# Patient Record
Sex: FEMALE | Race: BLACK OR AFRICAN AMERICAN | Employment: OTHER | URBAN - METROPOLITAN AREA
[De-identification: names, ages, dates, MRNs, and addresses within clinical notes are randomized per-mention and may not be internally consistent; named-entity substitution may affect disease eponyms.]

---

## 2017-03-01 PROBLEM — D64.9 ABSOLUTE ANEMIA: Status: ACTIVE | Noted: 2017-03-01

## 2017-03-01 PROBLEM — I11.9 HYPERTENSIVE HEART DISEASE WITHOUT HF (HEART FAILURE): Status: ACTIVE | Noted: 2017-03-01

## 2017-03-01 PROBLEM — M15.9 GENERALIZED OSTEOARTHROSIS, UNSPECIFIED SITE: Status: ACTIVE | Noted: 2017-03-01

## 2017-07-13 PROBLEM — R35.0 URINARY FREQUENCY: Status: ACTIVE | Noted: 2017-07-13

## 2017-07-13 PROBLEM — E53.8 B12 DEFICIENCY: Status: ACTIVE | Noted: 2017-07-13

## 2018-02-14 ENCOUNTER — PATIENT OUTREACH (OUTPATIENT)
Dept: CASE MANAGEMENT | Age: 83
End: 2018-02-14

## 2018-02-14 NOTE — PROGRESS NOTES
TC to Gisela Bustos, pt's [facility]  (#[phone]). She stated that the pt is eligible for nutritional supplements as long as the MD signs that they are medically necessary. She stated that she would fax the form to SW on 2/20/18 (she will not be back in her office until then) or will try to get her supervisor to fax the form. SW will then have MD to complete the form and SW will send back to [facility]. This note will not be viewable in [address]

## 2018-02-20 ENCOUNTER — PATIENT OUTREACH (OUTPATIENT)
Dept: CASE MANAGEMENT | Age: 83
End: 2018-02-20

## 2018-02-20 NOTE — PROGRESS NOTES
Quad cane order faxed to [address] ([phone]). Signed MD order for medical necessity for nutritional supplements emailed to [facility] . This note will not be viewable in [address]

## 2020-01-27 ENCOUNTER — APPOINTMENT (OUTPATIENT)
Dept: GENERAL RADIOLOGY | Age: 85
End: 2020-01-27
Attending: EMERGENCY MEDICINE
Payer: COMMERCIAL

## 2020-01-27 ENCOUNTER — HOSPITAL ENCOUNTER (EMERGENCY)
Age: 85
Discharge: HOME | End: 2020-01-27
Attending: EMERGENCY MEDICINE
Payer: COMMERCIAL

## 2020-01-27 VITALS
RESPIRATION RATE: 18 BRPM | SYSTOLIC BLOOD PRESSURE: 154 MMHG | OXYGEN SATURATION: 99 % | HEART RATE: 64 BPM | DIASTOLIC BLOOD PRESSURE: 96 MMHG | TEMPERATURE: 98.3 F

## 2020-01-27 DIAGNOSIS — R60.9 DEPENDENT EDEMA: ICD-10-CM
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
## 2020-01-27 DIAGNOSIS — S60.449A TIGHT RING ON FINGER: Primary | ICD-10-CM

## 2020-01-27 LAB
ALBUMIN SERPL-MCNC: 3.8 G/DL (ref 3.2–4.6)
ALBUMIN/GLOB SERPL: 1.1 {RATIO} (ref 1.2–3.5)
ALP SERPL-CCNC: 64 U/L (ref 50–136)
ALT SERPL-CCNC: 26 U/L (ref 12–65)
ANION GAP SERPL CALC-SCNC: 6 MMOL/L (ref 7–16)
AST SERPL-CCNC: 18 U/L (ref 15–37)
BASOPHILS # BLD: 0 K/UL (ref 0–0.2)
BASOPHILS NFR BLD: 1 % (ref 0–2)
BILIRUB SERPL-MCNC: 0.5 MG/DL (ref 0.2–1.1)
BNP SERPL-MCNC: 466 PG/ML
BUN SERPL-MCNC: 17 MG/DL (ref 8–23)
CALCIUM SERPL-MCNC: 10 MG/DL (ref 8.3–10.4)
CHLORIDE SERPL-SCNC: 109 MMOL/L (ref 98–107)
CO2 SERPL-SCNC: 27 MMOL/L (ref 21–32)
CREAT SERPL-MCNC: 0.93 MG/DL (ref 0.6–1)
DIFFERENTIAL METHOD BLD: ABNORMAL
EOSINOPHIL # BLD: 0 K/UL (ref 0–0.8)
EOSINOPHIL NFR BLD: 1 % (ref 0.5–7.8)
ERYTHROCYTE [DISTWIDTH] IN BLOOD BY AUTOMATED COUNT: 12.8 % (ref 11.9–14.6)
GLOBULIN SER CALC-MCNC: 3.4 G/DL (ref 2.3–3.5)
GLUCOSE SERPL-MCNC: 104 MG/DL (ref 65–100)
HCT VFR BLD AUTO: 43.3 % (ref 35.8–46.3)
HGB BLD-MCNC: 13.7 G/DL (ref 11.7–15.4)
IMM GRANULOCYTES # BLD AUTO: 0 K/UL (ref 0–0.5)
IMM GRANULOCYTES NFR BLD AUTO: 0 % (ref 0–5)
LYMPHOCYTES # BLD: 1.8 K/UL (ref 0.5–4.6)
LYMPHOCYTES NFR BLD: 36 % (ref 13–44)
MCH RBC QN AUTO: 33.7 PG (ref 26.1–32.9)
MCHC RBC AUTO-ENTMCNC: 31.6 G/DL (ref 31.4–35)
MCV RBC AUTO: 106.7 FL (ref 79.6–97.8)
MONOCYTES # BLD: 0.5 K/UL (ref 0.1–1.3)
MONOCYTES NFR BLD: 9 % (ref 4–12)
NEUTS SEG # BLD: 2.7 K/UL (ref 1.7–8.2)
NEUTS SEG NFR BLD: 54 % (ref 43–78)
NRBC # BLD: 0 K/UL (ref 0–0.2)
PLATELET # BLD AUTO: 230 K/UL (ref 150–450)
PMV BLD AUTO: 10.5 FL (ref 9.4–12.3)
POTASSIUM SERPL-SCNC: 4.3 MMOL/L (ref 3.5–5.1)
PROT SERPL-MCNC: 7.2 G/DL (ref 6.3–8.2)
RBC # BLD AUTO: 4.06 M/UL (ref 4.05–5.2)
SODIUM SERPL-SCNC: 142 MMOL/L (ref 136–145)
WBC # BLD AUTO: 5 K/UL (ref 4.3–11.1)

## 2020-01-27 PROCEDURE — 83880 ASSAY OF NATRIURETIC PEPTIDE: CPT

## 2020-01-27 PROCEDURE — 71046 X-RAY EXAM CHEST 2 VIEWS: CPT

## 2020-01-27 PROCEDURE — 99283 EMERGENCY DEPT VISIT LOW MDM: CPT

## 2020-01-27 PROCEDURE — 80053 COMPREHEN METABOLIC PANEL: CPT

## 2020-01-27 PROCEDURE — 85025 COMPLETE CBC W/AUTO DIFF WBC: CPT

## 2020-01-27 NOTE — ED PROVIDER NOTES
Here with swelling to her right index finger. She has 2 rings on this finger there is a ring that is riding underneath the other ring with both of them having a ring face and one face being entrapped behind the other. She has had progressive swelling. Patient also has dependent edema of concern to family though they do not wish her to be placed on diuretics. She has an appointment next week with cardiology. The history is provided by the patient. Finger Pain    The pain is present in the right fingers. Pertinent negatives include no numbness. She has tried nothing for the symptoms. 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 No pertinent surgical history.       Family History:   Family history unknown: Yes       Social History     Socioeconomic History    Marital status:      Spouse name: Not on file    Number of children: Not on file    Years of education: Not on file    Highest education level: Not on file   Occupational History    Not on file   Social Needs    Financial resource strain: Not on file    Food insecurity:     Worry: Not on file     Inability: Not on file    Transportation needs:     Medical: Not on file     Non-medical: Not on file   Tobacco Use    Smoking status: Former Smoker    Smokeless tobacco: Never Used   Substance and Sexual Activity    Alcohol use: Yes     Comment: occasional alcohol use    Drug use: No    Sexual activity: Not on file   Lifestyle    Physical activity:     Days per week: Not on file     Minutes per session: Not on file    Stress: Not on file   Relationships    Social connections:     Talks on phone: Not on file     Gets together: Not on file     Attends Buddhist service: Not on file     Active member of club or organization: Not on file     Attends meetings of clubs or organizations: Not on file     Relationship status: Not on file    Intimate partner violence:     Fear of current or ex partner: Not on file     Emotionally abused: Not on file     Physically abused: Not on file     Forced sexual activity: Not on file   Other Topics Concern    Not on file   Social History Narrative    Not on file         ALLERGIES: Patient has no known allergies. Review of Systems   Constitutional: Negative for chills and fever. HENT: Negative. Respiratory: Negative. Cardiovascular: Negative. Gastrointestinal: Negative. Musculoskeletal: Negative. Neurological: Negative. Negative for numbness. Psychiatric/Behavioral: Negative. All other systems reviewed and are negative. Vitals:    01/27/20 1218   BP: 132/69   Pulse: 66   Resp: 20   Temp: 97.9 °F (36.6 °C)   SpO2: 100%            Physical Exam  Vitals signs and nursing note reviewed. Constitutional:       General: She is not in acute distress. Appearance: She is well-developed. HENT:      Head: Atraumatic. Eyes:      General: No scleral icterus. Neck:      Musculoskeletal: Neck supple. Cardiovascular:      Rate and Rhythm: Normal rate. Pulmonary:      Effort: Pulmonary effort is normal. No respiratory distress. Musculoskeletal:         General: No deformity or signs of injury. Right lower leg: Edema present. Left lower leg: Edema present. Comments: Has 2 rings are nonremovable to the area just below the MCP joint on her right index finger. These are removed atraumatically after acceptance of removing ring provided by family and patient   Skin:     General: Skin is warm and dry. Neurological:      Sensory: No sensory deficit. Motor: No weakness. Psychiatric:         Thought Content:  Thought content normal.          MDM  Number of Diagnoses or Management Options  Dependent edema:   Tight ring on finger:   Diagnosis management comments: Patient here with 2 rings entrapping her proximal aspect of her right ring finger. Interestingly there is one ring overriding the second. I have attempted to remove the lower ring underneath main body of the upper but have been unsuccessful. Amount and/or Complexity of Data Reviewed  Clinical lab tests: reviewed and ordered  Tests in the radiology section of CPT®: reviewed and ordered  Obtain history from someone other than the patient: yes  Independent visualization of images, tracings, or specimens: yes    Risk of Complications, Morbidity, and/or Mortality  Presenting problems: moderate  Diagnostic procedures: low  Management options: moderate  General comments: They will encourage patient to walk more and see if that helps with her dependent edema. Option of putting her on a few days of diuretics was declined. Patient does have appointment with cardiology next week. Patient Progress  Patient progress: improved         Procedures      Recent Results (from the past 12 hour(s))   CBC WITH AUTOMATED DIFF    Collection Time: 01/27/20  1:20 PM   Result Value Ref Range    WBC 5.0 4.3 - 11.1 K/uL    RBC 4.06 4.05 - 5.2 M/uL    HGB 13.7 11.7 - 15.4 g/dL    HCT 43.3 35.8 - 46.3 %    .7 (H) 79.6 - 97.8 FL    MCH 33.7 (H) 26.1 - 32.9 PG    MCHC 31.6 31.4 - 35.0 g/dL    RDW 12.8 11.9 - 14.6 %    PLATELET 529 231 - 021 K/uL    MPV 10.5 9.4 - 12.3 FL    ABSOLUTE NRBC 0.00 0.0 - 0.2 K/uL    DF AUTOMATED      NEUTROPHILS 54 43 - 78 %    LYMPHOCYTES 36 13 - 44 %    MONOCYTES 9 4.0 - 12.0 %    EOSINOPHILS 1 0.5 - 7.8 %    BASOPHILS 1 0.0 - 2.0 %    IMMATURE GRANULOCYTES 0 0.0 - 5.0 %    ABS. NEUTROPHILS 2.7 1.7 - 8.2 K/UL    ABS. LYMPHOCYTES 1.8 0.5 - 4.6 K/UL    ABS. MONOCYTES 0.5 0.1 - 1.3 K/UL    ABS. EOSINOPHILS 0.0 0.0 - 0.8 K/UL    ABS. BASOPHILS 0.0 0.0 - 0.2 K/UL    ABS. IMM.  GRANS. 0.0 0.0 - 0.5 K/UL   METABOLIC PANEL, COMPREHENSIVE    Collection Time: 01/27/20  1:20 PM   Result Value Ref Range    Sodium 142 136 - 145 mmol/L Potassium 4.3 3.5 - 5.1 mmol/L    Chloride 109 (H) 98 - 107 mmol/L    CO2 27 21 - 32 mmol/L    Anion gap 6 (L) 7 - 16 mmol/L    Glucose 104 (H) 65 - 100 mg/dL    BUN 17 8 - 23 MG/DL    Creatinine 0.93 0.6 - 1.0 MG/DL    GFR est AA >60 >60 ml/min/1.73m2    GFR est non-AA >60 >60 ml/min/1.73m2    Calcium 10.0 8.3 - 10.4 MG/DL    Bilirubin, total 0.5 0.2 - 1.1 MG/DL    ALT (SGPT) 26 12 - 65 U/L    AST (SGOT) 18 15 - 37 U/L    Alk.  phosphatase 64 50 - 136 U/L    Protein, total 7.2 6.3 - 8.2 g/dL    Albumin 3.8 3.2 - 4.6 g/dL    Globulin 3.4 2.3 - 3.5 g/dL    A-G Ratio 1.1 (L) 1.2 - 3.5     NT-PRO BNP    Collection Time: 01/27/20  1:20 PM   Result Value Ref Range    NT pro- (H) <450 PG/ML

## 2020-01-27 NOTE — ED NOTES
I have reviewed discharge instructions with the patient. The patient verbalized understanding. Patient left ED via Discharge Method: wheelchair to Home with family. Opportunity for questions and clarification provided. Patient given 0 scripts. To continue your aftercare when you leave the hospital, you may receive an automated call from our care team to check in on how you are doing. This is a free service and part of our promise to provide the best care and service to meet your aftercare needs.  If you have questions, or wish to unsubscribe from this service please call [phone]. Thank you for Choosing our [facility] Emergency Department.

## 2020-01-27 NOTE — ED TRIAGE NOTES
Patient with daughter states that her right pointer finger is swollen. Patient has 2 rings stuck on finger. Daughter also reports bilateral lower extremity edema. Patient states that she sometimes gets SOB. Patient has some dementia.

## 2021-09-23 ENCOUNTER — APPOINTMENT (OUTPATIENT)
Dept: GENERAL RADIOLOGY | Age: 86
End: 2021-09-23
Attending: EMERGENCY MEDICINE
Payer: COMMERCIAL

## 2021-09-23 ENCOUNTER — HOSPITAL ENCOUNTER (EMERGENCY)
Age: 86
Discharge: HOME | End: 2021-09-24
Attending: EMERGENCY MEDICINE
Payer: COMMERCIAL

## 2021-09-23 DIAGNOSIS — K56.41 FECAL IMPACTION (HCC): Primary | ICD-10-CM

## 2021-09-23 DIAGNOSIS — K59.04 CHRONIC IDIOPATHIC CONSTIPATION: ICD-10-CM

## 2021-09-23 LAB
ALBUMIN SERPL-MCNC: 2.9 G/DL (ref 3.2–4.6)
ALBUMIN/GLOB SERPL: 0.7 {RATIO} (ref 1.2–3.5)
ALP SERPL-CCNC: 64 U/L (ref 50–136)
ALT SERPL-CCNC: 26 U/L (ref 12–65)
ANION GAP SERPL CALC-SCNC: 12 MMOL/L (ref 7–16)
APPEARANCE UR: CLEAR
AST SERPL-CCNC: 22 U/L (ref 15–37)
BASOPHILS # BLD: 0 K/UL (ref 0–0.2)
BASOPHILS NFR BLD: 0 % (ref 0–2)
BILIRUB SERPL-MCNC: 0.5 MG/DL (ref 0.2–1.1)
BILIRUB UR QL: NEGATIVE
BUN SERPL-MCNC: 19 MG/DL (ref 8–23)
CALCIUM SERPL-MCNC: 9.3 MG/DL (ref 8.3–10.4)
CHLORIDE SERPL-SCNC: 111 MMOL/L (ref 98–107)
CO2 SERPL-SCNC: 22 MMOL/L (ref 21–32)
COLOR UR: YELLOW
CREAT SERPL-MCNC: 0.8 MG/DL (ref 0.6–1)
DIFFERENTIAL METHOD BLD: ABNORMAL
EOSINOPHIL # BLD: 0.1 K/UL (ref 0–0.8)
EOSINOPHIL NFR BLD: 1 % (ref 0.5–7.8)
ERYTHROCYTE [DISTWIDTH] IN BLOOD BY AUTOMATED COUNT: 12.9 % (ref 11.9–14.6)
GLOBULIN SER CALC-MCNC: 3.9 G/DL (ref 2.3–3.5)
GLUCOSE SERPL-MCNC: 82 MG/DL (ref 65–100)
GLUCOSE UR STRIP.AUTO-MCNC: NEGATIVE MG/DL
HCT VFR BLD AUTO: 40.9 % (ref 35.8–46.3)
HGB BLD-MCNC: 12.9 G/DL (ref 11.7–15.4)
HGB UR QL STRIP: NEGATIVE
IMM GRANULOCYTES # BLD AUTO: 0 K/UL (ref 0–0.5)
IMM GRANULOCYTES NFR BLD AUTO: 0 % (ref 0–5)
KETONES UR QL STRIP.AUTO: NEGATIVE MG/DL
LEUKOCYTE ESTERASE UR QL STRIP.AUTO: NEGATIVE
LIPASE SERPL-CCNC: 98 U/L (ref 73–393)
LYMPHOCYTES # BLD: 1.6 K/UL (ref 0.5–4.6)
LYMPHOCYTES NFR BLD: 30 % (ref 13–44)
MCH RBC QN AUTO: 34.5 PG (ref 26.1–32.9)
MCHC RBC AUTO-ENTMCNC: 31.5 G/DL (ref 31.4–35)
MCV RBC AUTO: 109.4 FL (ref 79.6–97.8)
MONOCYTES # BLD: 0.5 K/UL (ref 0.1–1.3)
MONOCYTES NFR BLD: 9 % (ref 4–12)
NEUTS SEG # BLD: 3 K/UL (ref 1.7–8.2)
NEUTS SEG NFR BLD: 60 % (ref 43–78)
NITRITE UR QL STRIP.AUTO: NEGATIVE
NRBC # BLD: 0 K/UL (ref 0–0.2)
PH UR STRIP: 6 [PH] (ref 5–9)
PLATELET # BLD AUTO: 202 K/UL (ref 150–450)
PMV BLD AUTO: 10.5 FL (ref 9.4–12.3)
POTASSIUM SERPL-SCNC: 4.7 MMOL/L (ref 3.5–5.1)
PROT SERPL-MCNC: 6.8 G/DL (ref 6.3–8.2)
PROT UR STRIP-MCNC: NEGATIVE MG/DL
RBC # BLD AUTO: 3.74 M/UL (ref 4.05–5.2)
SODIUM SERPL-SCNC: 145 MMOL/L (ref 136–145)
SP GR UR REFRACTOMETRY: 1.01 (ref 1–1.02)
UROBILINOGEN UR QL STRIP.AUTO: 1 EU/DL (ref 0.2–1)
WBC # BLD AUTO: 5.2 K/UL (ref 4.3–11.1)

## 2021-09-23 PROCEDURE — 74011250636 HC RX REV CODE- 250/636: Performed by: EMERGENCY MEDICINE

## 2021-09-23 PROCEDURE — 99284 EMERGENCY DEPT VISIT MOD MDM: CPT

## 2021-09-23 PROCEDURE — 74011250637 HC RX REV CODE- 250/637: Performed by: EMERGENCY MEDICINE

## 2021-09-23 PROCEDURE — 85025 COMPLETE CBC W/AUTO DIFF WBC: CPT

## 2021-09-23 PROCEDURE — 83690 ASSAY OF LIPASE: CPT

## 2021-09-23 PROCEDURE — 81003 URINALYSIS AUTO W/O SCOPE: CPT

## 2021-09-23 PROCEDURE — 74022 RADEX COMPL AQT ABD SERIES: CPT

## 2021-09-23 PROCEDURE — 96374 THER/PROPH/DIAG INJ IV PUSH: CPT

## 2021-09-23 PROCEDURE — 80053 COMPREHEN METABOLIC PANEL: CPT

## 2021-09-23 RX ORDER — ONDANSETRON 2 MG/ML
8 INJECTION INTRAMUSCULAR; INTRAVENOUS
Status: COMPLETED | OUTPATIENT
Start: 2021-09-23 | End: 2021-09-23

## 2021-09-23 RX ORDER — MAGNESIUM CITRATE
296 SOLUTION, ORAL ORAL
Status: COMPLETED | OUTPATIENT
Start: 2021-09-23 | End: 2021-09-23

## 2021-09-23 RX ORDER — SODIUM CHLORIDE 0.9 % (FLUSH) 0.9 %
5-10 SYRINGE (ML) INJECTION AS NEEDED
Status: DISCONTINUED | OUTPATIENT
Start: 2021-09-23 | End: 2021-09-24 | Stop reason: HOSPADM

## 2021-09-23 RX ORDER — SODIUM CHLORIDE 0.9 % (FLUSH) 0.9 %
5-10 SYRINGE (ML) INJECTION EVERY 8 HOURS
Status: DISCONTINUED | OUTPATIENT
Start: 2021-09-23 | End: 2021-09-24 | Stop reason: HOSPADM

## 2021-09-23 RX ADMIN — MAGNESIUM CITRATE 296 ML: 1.75 LIQUID ORAL at 22:18

## 2021-09-23 RX ADMIN — ONDANSETRON 8 MG: 2 INJECTION INTRAMUSCULAR; INTRAVENOUS at 23:59

## 2021-09-23 RX ADMIN — SODIUM CHLORIDE 1000 ML: 900 INJECTION, SOLUTION INTRAVENOUS at 21:31

## 2021-09-24 VITALS
HEIGHT: 67 IN | HEART RATE: 62 BPM | OXYGEN SATURATION: 98 % | WEIGHT: 102 LBS | TEMPERATURE: 98.8 F | DIASTOLIC BLOOD PRESSURE: 85 MMHG | SYSTOLIC BLOOD PRESSURE: 134 MMHG | BODY MASS INDEX: 16.01 KG/M2 | RESPIRATION RATE: 20 BRPM

## 2021-09-24 RX ORDER — POLYETHYLENE GLYCOL 3350 17 G/17G
17 POWDER, FOR SOLUTION ORAL DAILY
Qty: 850 G | Refills: 0 | Status: SHIPPED | OUTPATIENT
Start: 2021-09-24

## 2021-09-24 NOTE — ED PROVIDER NOTES
Mandie Henderson is a 80 y.o. female seen on 9/24/2021 in the [facility] EMERGENCY DEPT in room ER08/08. History limited secondary to patient's dementia. Chief Complaint   Patient presents with    Constipation     HPI: 59-year-old -American female presented emergency department with her daughter with complaints of constipation. Patient has no complaints personally. Patient's daughter states that the patient has not had a good bowel movement more than a month. Patient's appetite has been good and she has been eating and drinking appropriately. She has not been complaining of abdominal pain. Patient's daughter states that she has had to disimpact patient on multiple occasions. Further review of systems is limited secondary to patient's baseline dementia. Historian: Patient's daughter    REVIEW OF SYSTEMS     Review of Systems   Unable to perform ROS: Dementia   Constitutional: Negative for appetite change. Gastrointestinal: Positive for constipation. Genitourinary: Negative. Psychiatric/Behavioral: Negative. PAST MEDICAL HISTORY     Past Medical History:   Diagnosis Date    Absolute anemia 3/1/2017    Generalized osteoarthrosis, unspecified site 3/1/2017    Hypertensive heart disease without HF (heart failure) 3/1/2017     No past surgical history on file.   Social History     Socioeconomic History    Marital status:      Spouse name: Not on file    Number of children: Not on file    Years of education: Not on file    Highest education level: Not on file   Tobacco Use    Smoking status: Former Smoker    Smokeless tobacco: Never Used   Substance and Sexual Activity    Alcohol use: Yes     Comment: occasional alcohol use    Drug use: No     Social Determinants of Health     Financial Resource Strain:     Difficulty of Paying Living Expenses:    Food Insecurity:     Worried About Running Out of Food in the Last Year:     Ran Out of Food in the Last Year:    Transportation Needs:     Lack of Transportation (Medical):  Lack of Transportation (Non-Medical):    Physical Activity:     Days of Exercise per Week:     Minutes of Exercise per Session:    Stress:     Feeling of Stress :    Social Connections:     Frequency of Communication with Friends and Family:     Frequency of Social Gatherings with Friends and Family:     Attends Oriental orthodox Services:     Active Member of Clubs or Organizations:     Attends Club or Organization Meetings:     Marital Status:      Prior to Admission Medications   Prescriptions Last Dose Informant Patient Reported? Taking? QUEtiapine (SEROquel) 50 mg tablet   No No   Si-2 bedtime and can take an additional during the day as needed,   amLODIPine (NORVASC) 5 mg tablet   No No   Sig: Take 1 Tab by mouth daily. apixaban (ELIQUIS) 2.5 mg tablet   No No   Sig: Take 1 Tab by mouth two (2) times a day. aspirin delayed-release 81 mg tablet   No No   Sig: Take 1 Tab by mouth daily. benazepriL (LOTENSIN) 10 mg tablet   No No   Sig: Take 1 Tab by mouth daily. Indications: high blood pressure   food supplemt, lactose-reduced (Ensure Active Heart [employer]) liqd   No No   Sig: Take 237 mL by mouth two (2) times a day. mirtazapine (REMERON SOL-TAB) 15 mg disintegrating tablet   No No   Sig: Take 1 Tab by mouth nightly. multivitamin (ONE A DAY) tablet   No No   Sig: Take 1 Tab by mouth daily. oxybutynin (DITROPAN) 5 mg tablet   No No   Sig: Take 1 Tab by mouth three (3) times daily. pravastatin (PRAVACHOL) 40 mg tablet   No No   Sig: Take 1 Tab by mouth daily. Facility-Administered Medications: None     No Known Allergies     PHYSICAL EXAM       Vitals:    21 2046 21 2132   BP: (!) 142/93  (!) 142/68   Pulse: 63 62    Resp: 20     Temp: 98.8 °F (37.1 °C)     SpO2: 98% 98%     Vital signs were reviewed. Physical Exam  Vitals and nursing note reviewed. Constitutional:       Appearance: Normal appearance. She is not ill-appearing. HENT:      Head: Normocephalic and atraumatic. Mouth/Throat:      Mouth: Mucous membranes are dry. Eyes:      Extraocular Movements: Extraocular movements intact. Cardiovascular:      Rate and Rhythm: Normal rate. Pulses: Normal pulses. Heart sounds: Normal heart sounds. Abdominal:      General: There is distension (Mild distention). Palpations: Abdomen is soft. Tenderness: There is no abdominal tenderness. There is no guarding or rebound. Genitourinary:     Comments: Significant stool in the rectal vault  Musculoskeletal:         General: Normal range of motion. Skin:     General: Skin is warm and dry. Neurological:      Mental Status: She is alert. Mental status is at baseline. Comments: Oriented to person and place   Psychiatric:      Comments: Very pleasant. Baseline dementia.           MEDICAL DECISION MAKING     ED Course:    Orders Placed This Encounter    XR ABD ACUTE W 1 V CHEST    CBC with Diff    CMP    Lipase    LACTIC ACID    LACTIC ACID 2 HOUR REPEAT    URINALYSIS W/ RFLX MICROSCOPIC    DIET NPO    POC Urine Dipstick    ENEMA MILK AND MOLASSES    ARTERIAL BLOOD GAS (ASAP ARIS)    SALINE LOCK IV ONE TIME Routine    sodium chloride (NS) flush 5-10 mL    sodium chloride (NS) flush 5-10 mL    sodium chloride 0.9 % bolus infusion 1,000 mL    magnesium citrate solution 296 mL    ondansetron (ZOFRAN) injection 8 mg    polyethylene glycol (Miralax) 17 gram/dose powder     Recent Results (from the past 8 hour(s))   CBC WITH AUTOMATED DIFF    Collection Time: 09/23/21  8:46 PM   Result Value Ref Range    WBC 5.2 4.3 - 11.1 K/uL    RBC 3.74 (L) 4.05 - 5.2 M/uL    HGB 12.9 11.7 - 15.4 g/dL    HCT 40.9 35.8 - 46.3 %    .4 (H) 79.6 - 97.8 FL    MCH 34.5 (H) 26.1 - 32.9 PG    MCHC 31.5 31.4 - 35.0 g/dL    RDW 12.9 11.9 - 14.6 %    PLATELET 170 778 - 959 K/uL    MPV 10.5 9.4 - 12.3 FL    ABSOLUTE NRBC 0.00 0.0 - 0.2 K/uL    NEUTROPHILS 60 43 - 78 %    LYMPHOCYTES 30 13 - 44 %    MONOCYTES 9 4.0 - 12.0 %    EOSINOPHILS 1 0.5 - 7.8 %    BASOPHILS 0 0.0 - 2.0 %    IMMATURE GRANULOCYTES 0 0.0 - 5.0 %    ABS. NEUTROPHILS 3.0 1.7 - 8.2 K/UL    ABS. LYMPHOCYTES 1.6 0.5 - 4.6 K/UL    ABS. MONOCYTES 0.5 0.1 - 1.3 K/UL    ABS. EOSINOPHILS 0.1 0.0 - 0.8 K/UL    ABS. BASOPHILS 0.0 0.0 - 0.2 K/UL    ABS. IMM. GRANS. 0.0 0.0 - 0.5 K/UL    DF AUTOMATED     METABOLIC PANEL, COMPREHENSIVE    Collection Time: 09/23/21  8:46 PM   Result Value Ref Range    Sodium 145 136 - 145 mmol/L    Potassium 4.7 3.5 - 5.1 mmol/L    Chloride 111 (H) 98 - 107 mmol/L    CO2 22 21 - 32 mmol/L    Anion gap 12 7 - 16 mmol/L    Glucose 82 65 - 100 mg/dL    BUN 19 8 - 23 MG/DL    Creatinine 0.80 0.6 - 1.0 MG/DL    GFR est AA >60 >60 ml/min/1.73m2    GFR est non-AA >60 >60 ml/min/1.73m2    Calcium 9.3 8.3 - 10.4 MG/DL    Bilirubin, total 0.5 0.2 - 1.1 MG/DL    ALT (SGPT) 26 12 - 65 U/L    AST (SGOT) 22 15 - 37 U/L    Alk. phosphatase 64 50 - 136 U/L    Protein, total 6.8 6.3 - 8.2 g/dL    Albumin 2.9 (L) 3.2 - 4.6 g/dL    Globulin 3.9 (H) 2.3 - 3.5 g/dL    A-G Ratio 0.7 (L) 1.2 - 3.5     LIPASE    Collection Time: 09/23/21  8:46 PM   Result Value Ref Range    Lipase 98 73 - 393 U/L   URINALYSIS W/ RFLX MICROSCOPIC    Collection Time: 09/23/21 10:52 PM   Result Value Ref Range    Color YELLOW      Appearance CLEAR      Specific gravity 1.015 1.001 - 1.023      pH (UA) 6.0 5.0 - 9.0      Protein Negative NEG mg/dL    Glucose Negative mg/dL    Ketone Negative NEG mg/dL    Bilirubin Negative NEG      Blood Negative NEG      Urobilinogen 1.0 0.2 - 1.0 EU/dL    Nitrites Negative NEG      Leukocyte Esterase Negative NEG       XR ABD ACUTE W 1 V CHEST    Result Date: 9/23/2021  Exam: XR ABD ACUTE W 1 V CHEST on 9/23/2021 10:19 PM Clinical History: The Female patient is 80years old  presenting for constipation.  Comparison:  Chest x-ray 1/27/2020 Findings: The lungs are free of acute infiltrate. There is continued elevation of the left hemidiaphragm. There is no pleural effusion or pneumothorax. The cardiomediastinal silhouette is within normal limits. There is no acute osseous abnormality. There is marked gaseous distention of bowel loops throughout the abdomen with a large amount of stool in the rectal vault suggesting impaction. There is no soft tissue mass or organomegaly. No abnormal calcifications are identified. There is no free intraperitoneal air. No acute osseous abnormality is demonstrated. 1.  No acute cardiopulmonary disease. 2.  Suggested fecal impaction with gaseous distended loops of bowel throughout abdomen CPT code(s) 29493,48468           MDM  Number of Diagnoses or Management Options  Diagnosis management comments: 54-year-old female presented to the emergency department with significant fecal impaction. Patient's labs were reassuring. Patient was given magnesium citrate to drink and I performed a manual disimpaction with clearing of the entire rectal vault. Patient will be discharged home. She will return the emergency department for any concerns. She was also given a milk molasses enema to help with continued bowel movements. Amount and/or Complexity of Data Reviewed  Clinical lab tests: ordered and reviewed  Tests in the radiology section of CPT®: ordered and reviewed  Obtain history from someone other than the patient: yes  Independent visualization of images, tracings, or specimens: yes    Patient Progress  Patient progress: improved        Disposition:  Discharged  Diagnosis:     ICD-10-CM ICD-9-CM   1. Fecal impaction (HCC)  K56.41 560.32   2. Chronic idiopathic constipation  K59.04 564.00     ____________________________________________________________________  A portion of this note was generated using voice recognition dictation software.  While the note has been reviewed for accuracy, please note certain words and phrases may not be transcribed as intended and some grammatical and/or typographical errors may be present.

## 2021-09-24 NOTE — ED TRIAGE NOTES
Arrives with face mask in place. Daughter arrives with pt to answer triage questions. Reports constipation, reports last normal BM approx 1 month ago. Daughter reports has attempted multiple OTC meds without relief. Pt denies abdominal pain, n/v, fever/chills. Daughter reports normal urination.  Daughter reports eating  normal

## 2021-09-24 NOTE — DISCHARGE INSTRUCTIONS
Encourage fluid hydration. Return to the emergency department for any concerns. You may have some mild bleeding from the disimpaction.

## 2021-09-24 NOTE — ED NOTES
I have reviewed discharge instructions with the patient. The patient verbalized understanding. Patient left ED via Discharge Method: wheelchair to Home with son    Opportunity for questions and clarification provided. Patient given 0 scripts. To continue your aftercare when you leave the hospital, you may receive an automated call from our care team to check in on how you are doing. This is a free service and part of our promise to provide the best care and service to meet your aftercare needs.  If you have questions, or wish to unsubscribe from this service please call [phone]. Thank you for Choosing our [facility] Emergency Department.

## 2021-10-07 PROBLEM — E78.5 HYPERLIPIDEMIA: Status: ACTIVE | Noted: 2020-08-20

## 2021-10-07 PROBLEM — I48.0 PAROXYSMAL ATRIAL FIBRILLATION (HCC): Status: ACTIVE | Noted: 2020-08-20

## 2021-10-07 PROBLEM — F03.90 DEMENTIA (HCC): Status: ACTIVE | Noted: 2020-08-20

## 2021-10-07 PROBLEM — E44.0 PROTEIN-CALORIE MALNUTRITION, MODERATE (HCC): Status: ACTIVE | Noted: 2020-08-20

## 2021-10-11 ENCOUNTER — DOCUMENTATION ONLY (OUTPATIENT)
Dept: CASE MANAGEMENT | Age: 86
End: 2021-10-11

## 2021-10-11 NOTE — ACP (ADVANCE CARE PLANNING)
Advance Care Planning   Ambulatory ACP Specialist Patient Outreach    Date:  10/11/2021    ACP Specialist:  Chandu Easley LMSW    Outreach call to patient in follow-up to ACP Specialist referral from:    [x] PCP  [] Provider   [] Ambulatory Care Management [] Other     For:                  [x] Advance Directive Assistance              [] Complete Portable DNR order              [] Complete POST/MOST              [x] Code Status Discussion             [x] Discuss Goals of Care             [x] Early ACP Decision-Making              [] Other (Specify)    Date Referral Received:10/8/21    Today's Outreach:  [] First   [] Second  [] Third       Third outreach made by: [] Phone  [] Email / mail    [] [employer]     Intervention:  [] Spoke with Patient   [] Left VM requesting return call      Outcome: Pt's dtr called SW back and said that she and her siblings have felt like they needed to move forward with this process for a while now. Pt's dtr asked for information to be emailed to her for review as a family and a follow up call next week. Next Step:   [] ACP scheduled conversation  [x] Outreach again in one week               [x] Email / Mail ACP Info Sheets  [x] Email / Mail Advance Directive   [] Closing referral.  Routing closure to referring provider/staff and to ACP Specialist . [] Closure letter mailed to patient with invitation to contact ACP Specialist if / when ready.   Thank you for this referral.

## 2021-10-11 NOTE — ACP (ADVANCE CARE PLANNING)
Advance Care Planning   Ambulatory ACP Specialist Patient Outreach    Date:  10/11/2021    ACP Specialist:  Elma Shukla LMSW    Outreach call to patient in follow-up to ACP Specialist referral from:    [x] PCP  [] Provider   [] Ambulatory Care Management [] Other     For:                  [x] Advance Directive Assistance              [] Complete Portable DNR order              [] Complete POST/MOST              [x] Code Status Discussion             [x] Discuss Goals of Care             [x] Early ACP Decision-Making              [] Other (Specify)    Date Referral Received:10/8/21    Today's Outreach:  [x] First   [] Second  [] Third       Third outreach made by: [] Phone  [] Email / mail    [] [employer]     Intervention:  [] Spoke with Patient   [x] Left VM requesting return call      Outcome: SW called to try and touch base with pt about ACP referral.  However, SW got pt's voice mail and had to leave a message     Next Step:   [] ACP scheduled conversation  [x] Outreach again in one week               [] Email / Mail [address]  [] Email / Mail Advance Directive   [] Closing referral.  Routing closure to referring provider/staff and to ACP Specialist . [] Closure letter mailed to patient with invitation to contact ACP Specialist if / when ready.   Thank you for this referral.

## 2021-10-18 ENCOUNTER — DOCUMENTATION ONLY (OUTPATIENT)
Dept: CASE MANAGEMENT | Age: 86
End: 2021-10-18

## 2021-10-18 NOTE — ACP (ADVANCE CARE PLANNING)
Advance Care Planning   Ambulatory ACP Specialist Patient Outreach    Date:  10/18/2021    ACP Specialist:  Jose Del Cid LMSW    Outreach call to patient in follow-up to ACP Specialist referral from:    [x] PCP  [] Provider   [] Ambulatory Care Management [] Other     For:                  [x] Advance Directive Assistance              [] Complete Portable DNR order              [] Complete POST/MOST              [x] Code Status Discussion             [x] Discuss Goals of Care             [x] Early ACP Decision-Making              [] Other (Specify)    Date Referral Received:10/8/21    Today's Outreach:  [] First   [] Second  [x] Third       Third outreach made by: [] Phone  [] Email / mail    [] [employer]     Intervention:  [] Spoke with Patient   [] Left VM requesting return call      Outcome: SW called and spoke with pt's dtr again today. Pt's dtr said that the family as a whole hasn't had a chance to come together and go over pt's wishes yet. Pt's dtr said she would call when they were ready to move forward. SW made sure that they know how to get in touch and that for now we will be closing this referral she was able to verbalize understanding. Next Step:   [] ACP scheduled conversation  [] Outreach again in one week               [] Email / Mail ACP Info Sheets  [] Email / Mail Advance Directive   [x] Closing referral.  Routing closure to referring provider/staff and to ACP Specialist . [] Closure letter mailed to patient with invitation to contact ACP Specialist if / when ready.   Thank you for this referral.

## 2022-03-18 PROBLEM — I48.0 PAROXYSMAL ATRIAL FIBRILLATION (HCC): Status: ACTIVE | Noted: 2020-08-20

## 2022-03-18 PROBLEM — E78.5 HYPERLIPIDEMIA: Status: ACTIVE | Noted: 2020-08-20

## 2022-03-18 PROBLEM — I11.9 HYPERTENSIVE HEART DISEASE WITHOUT HF (HEART FAILURE): Status: ACTIVE | Noted: 2017-03-01

## 2022-03-18 PROBLEM — D64.9 ABSOLUTE ANEMIA: Status: ACTIVE | Noted: 2017-03-01

## 2022-03-19 PROBLEM — F03.90 DEMENTIA (HCC): Status: ACTIVE | Noted: 2020-08-20

## 2022-03-19 PROBLEM — E44.0 PROTEIN-CALORIE MALNUTRITION, MODERATE (HCC): Status: ACTIVE | Noted: 2020-08-20

## 2022-03-20 PROBLEM — E53.8 B12 DEFICIENCY: Status: ACTIVE | Noted: 2017-07-13

## 2022-03-20 PROBLEM — R35.0 URINARY FREQUENCY: Status: ACTIVE | Noted: 2017-07-13

## 2022-03-21 ENCOUNTER — APPOINTMENT (OUTPATIENT)
Dept: CT IMAGING | Age: 87
End: 2022-03-21
Attending: EMERGENCY MEDICINE
Payer: COMMERCIAL

## 2022-03-21 ENCOUNTER — HOSPITAL ENCOUNTER (EMERGENCY)
Age: 87
Discharge: HOME | End: 2022-03-21
Attending: EMERGENCY MEDICINE
Payer: COMMERCIAL

## 2022-03-21 VITALS
OXYGEN SATURATION: 99 % | SYSTOLIC BLOOD PRESSURE: 125 MMHG | WEIGHT: 105 LBS | TEMPERATURE: 97.8 F | DIASTOLIC BLOOD PRESSURE: 99 MMHG | BODY MASS INDEX: 16.48 KG/M2 | HEIGHT: 67 IN | HEART RATE: 64 BPM | RESPIRATION RATE: 20 BRPM

## 2022-03-21 DIAGNOSIS — S09.90XA CLOSED HEAD INJURY, INITIAL ENCOUNTER: ICD-10-CM

## 2022-03-21 DIAGNOSIS — S16.1XXA ACUTE CERVICAL MYOFASCIAL STRAIN, INITIAL ENCOUNTER: ICD-10-CM
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
## 2022-03-21 LAB
ALBUMIN SERPL-MCNC: 3.4 G/DL (ref 3.2–4.6)
ALBUMIN/GLOB SERPL: 1 {RATIO} (ref 1.2–3.5)
ALP SERPL-CCNC: 76 U/L (ref 50–136)
ALT SERPL-CCNC: 36 U/L (ref 12–65)
ANION GAP SERPL CALC-SCNC: 4 MMOL/L (ref 7–16)
AST SERPL-CCNC: 35 U/L (ref 15–37)
BASOPHILS # BLD: 0 K/UL (ref 0–0.2)
BASOPHILS NFR BLD: 1 % (ref 0–2)
BILIRUB SERPL-MCNC: 0.4 MG/DL (ref 0.2–1.1)
BUN SERPL-MCNC: 25 MG/DL (ref 8–23)
CALCIUM SERPL-MCNC: 9.1 MG/DL (ref 8.3–10.4)
CHLORIDE SERPL-SCNC: 107 MMOL/L (ref 98–107)
CO2 SERPL-SCNC: 28 MMOL/L (ref 21–32)
CREAT SERPL-MCNC: 1 MG/DL (ref 0.6–1)
DIFFERENTIAL METHOD BLD: ABNORMAL
EOSINOPHIL # BLD: 0 K/UL (ref 0–0.8)
EOSINOPHIL NFR BLD: 1 % (ref 0.5–7.8)
ERYTHROCYTE [DISTWIDTH] IN BLOOD BY AUTOMATED COUNT: 13.1 % (ref 11.9–14.6)
GLOBULIN SER CALC-MCNC: 3.4 G/DL (ref 2.3–3.5)
GLUCOSE SERPL-MCNC: 100 MG/DL (ref 65–100)
HCT VFR BLD AUTO: 37.2 % (ref 35.8–46.3)
HGB BLD-MCNC: 12 G/DL (ref 11.7–15.4)
IMM GRANULOCYTES # BLD AUTO: 0 K/UL (ref 0–0.5)
IMM GRANULOCYTES NFR BLD AUTO: 1 % (ref 0–5)
LYMPHOCYTES # BLD: 1 K/UL (ref 0.5–4.6)
LYMPHOCYTES NFR BLD: 25 % (ref 13–44)
MCH RBC QN AUTO: 33.9 PG (ref 26.1–32.9)
MCHC RBC AUTO-ENTMCNC: 32.3 G/DL (ref 31.4–35)
MCV RBC AUTO: 105.1 FL (ref 79.6–97.8)
MONOCYTES # BLD: 0.3 K/UL (ref 0.1–1.3)
MONOCYTES NFR BLD: 7 % (ref 4–12)
NEUTS SEG # BLD: 2.7 K/UL (ref 1.7–8.2)
NEUTS SEG NFR BLD: 67 % (ref 43–78)
NRBC # BLD: 0 K/UL (ref 0–0.2)
PLATELET # BLD AUTO: 150 K/UL (ref 150–450)
PMV BLD AUTO: 12.3 FL (ref 9.4–12.3)
POTASSIUM SERPL-SCNC: 5.2 MMOL/L (ref 3.5–5.1)
PROT SERPL-MCNC: 6.8 G/DL (ref 6.3–8.2)
RBC # BLD AUTO: 3.54 M/UL (ref 4.05–5.2)
SODIUM SERPL-SCNC: 139 MMOL/L (ref 136–145)
WBC # BLD AUTO: 4 K/UL (ref 4.3–11.1)

## 2022-03-21 PROCEDURE — 70486 CT MAXILLOFACIAL W/O DYE: CPT

## 2022-03-21 PROCEDURE — 70450 CT HEAD/BRAIN W/O DYE: CPT

## 2022-03-21 PROCEDURE — 72125 CT NECK SPINE W/O DYE: CPT

## 2022-03-21 PROCEDURE — 99284 EMERGENCY DEPT VISIT MOD MDM: CPT

## 2022-03-21 PROCEDURE — 80053 COMPREHEN METABOLIC PANEL: CPT

## 2022-03-21 PROCEDURE — 85025 COMPLETE CBC W/AUTO DIFF WBC: CPT

## 2022-03-21 NOTE — ED NOTES
Discharge instructions and follow up care reviewed with pt and family. Pt assisted to wheelchair and out of department at this time.

## 2022-03-21 NOTE — ED PROVIDER NOTES
This is an 59-year-old female brought in by her daughter. Patient lives with her son. The patient has dementia cannot really give much history. She remembers falling but not sure when it was was. She denies to me any pain. Patient evidently fell in the middle of the night about 36 hours ago getting up to possibly go to the bathroom. Son evidently checked on the patient she did not complain of any pain and no obvious injuries were noted and the patient went back to bed. Daughter checked on patient today and noticed a lot of bruising about the head and face and brought her in to be checked. Takes a baby aspirin today but no other anticoagulants. History of some anemia and hypertension as well. Daughter does not know of any vomiting any lethargy any fever or trouble breathing. The history is provided by the patient and a relative. Fall  The accident occurred yesterday. The fall occurred while standing. She fell from a height of ground level. She landed on hard floor. There was no blood loss. The patient is experiencing no pain. She was ambulatory at the scene. Pertinent negatives include no visual change, no fever, no abdominal pain, no vomiting, no headaches, no extremity weakness, no loss of consciousness and no laceration. The risk factors include dementia. She has tried acetaminophen for the symptoms. Past Medical History:   Diagnosis Date    Absolute anemia 3/1/2017    Generalized osteoarthrosis, unspecified site 3/1/2017    Hypertensive heart disease without HF (heart failure) 3/1/2017       No past surgical history on file.       Family History:   Family history unknown: Yes       Social History     Socioeconomic History    Marital status:      Spouse name: Not on file    Number of children: Not on file    Years of education: Not on file    Highest education level: Not on file   Occupational History    Not on file   Tobacco Use    Smoking status: Former Smoker    Smokeless tobacco: Never Used   Substance and Sexual Activity    Alcohol use: Yes     Comment: occasional alcohol use    Drug use: No    Sexual activity: Not on file   Other Topics Concern    Not on file   Social History Narrative    Not on file     Social Determinants of Health     Financial Resource Strain:     Difficulty of Paying Living Expenses: Not on file   Food Insecurity:     Worried About Running Out of Food in the Last Year: Not on file    Ld of Food in the Last Year: Not on file   Transportation Needs:     Lack of Transportation (Medical): Not on file    Lack of Transportation (Non-Medical): Not on file   Physical Activity:     Days of Exercise per Week: Not on file    Minutes of Exercise per Session: Not on file   Stress:     Feeling of Stress : Not on file   Social Connections:     Frequency of Communication with Friends and Family: Not on file    Frequency of Social Gatherings with Friends and Family: Not on file    Attends Church Services: Not on file    Active Member of [address] [place] [address] or Organizations: Not on file    Attends Club or Organization Meetings: Not on file    Marital Status: Not on file   Intimate Partner Violence:     Fear of Current or Ex-Partner: Not on file    Emotionally Abused: Not on file    Physically Abused: Not on file    Sexually Abused: Not on file   Housing Stability:     Unable to Pay for Housing in the Last Year: Not on file    Number of [place] in the Last Year: Not on file    Unstable Housing in the Last Year: Not on file         ALLERGIES: Patient has no known allergies. Review of Systems   Unable to perform ROS: Dementia   Constitutional: Negative for chills and fever. Respiratory: Negative for cough and shortness of breath. Cardiovascular: Negative for chest pain. Gastrointestinal: Negative for abdominal pain and vomiting. Musculoskeletal: Negative for extremity weakness. Neurological: Negative for loss of consciousness and headaches. Vitals:    03/21/22 1339   BP: 133/81   Pulse: 64   Resp: 20   Temp: 97.8 °F (36.6 °C)   SpO2: 90%   Weight: 47.6 kg (105 lb)   Height: 5' 7\" (1.702 m)            Physical Exam  Vitals and nursing note reviewed. Constitutional:       General: She is not in acute distress. Appearance: She is well-developed. HENT:      Head: Atraumatic. Comments: Frontal contusion with mild soft tissue swelling. Also periorbital contusions bilaterally. No particular facial tenderness     Nose: Nose normal.   Eyes:      Extraocular Movements: Extraocular movements intact. Pupils: Pupils are equal, round, and reactive to light. Comments: Subconjunctival hemorrhages bilaterally   Cardiovascular:      Rate and Rhythm: Normal rate and regular rhythm. Heart sounds: Normal heart sounds. Pulmonary:      Effort: Pulmonary effort is normal.      Breath sounds: Normal breath sounds. Chest:      Comments: Chest wall nontender  Abdominal:      General: There is no distension. Palpations: Abdomen is soft. Tenderness: There is no abdominal tenderness. Musculoskeletal:      Cervical back: Normal range of motion and neck supple. No muscular tenderness. Comments: No issues with pain or limited range of motion in either shoulder elbow hips or knees. Skin:     General: Skin is warm. Findings: No laceration. Neurological:      Mental Status: She is alert. Comments: Speech normal  Oriented x1. Recognizes daughter. Daughter states baseline mentation. Equal . Too much dementia to cooperate with formal exam          MDM  Number of Diagnoses or Management Options  Diagnosis management comments: Elderly patient with fall 1930 6 hours ago. CT of head and spine.        Amount and/or Complexity of Data Reviewed  Tests in the radiology section of CPT®: ordered and reviewed  Review and summarize past medical records: yes    Risk of Complications, Morbidity, and/or Mortality  Presenting problems: moderate  Diagnostic procedures: low  Management options: low    Patient Progress  Patient progress: stable         Procedures        CT HEAD WO CONT    Result Date: 3/21/2022  HEAD CT WITHOUT CONTRAST  3/21/2022 HISTORY:   Head Trauma  2 days after fall out of bed, presents with bruising to bilateral eye, left forehead, and nose. Denies blood thinner. History of dementia, daughter states that she is at her baseline mentation. TECHNIQUE: Noncontrast axial images were obtained through the brain. All CT scans at this facility used dose modulation, interactive reconstruction and/or weight based dosing when appropriate to reduce radiation dose to as low as reasonably achievable. COMPARISON: None FINDINGS: There is no acute intracranial hemorrhage, significant mass effect or CT evidence of acute large artery territorial infarction. Please note that a hyperacute infarct or small vessel infarct may not be apparent on initial CT imaging. Motion artifact is present throughout the study. This midportion may obscure intracranial pathology. Cerebral volume loss is present with symmetric hippocampal atrophy. Areas of decreased attenuation in the supratentorial white matter suggest changes of chronic small vessel ischemic disease. Soft tissue swelling is present in the left frontal scalp. There is no hydrocephalus , intra-axial mass or abnormal extra-axial fluid collection. There are no displaced skull fractures. The mastoid air cells and paranasal sinuses are clear where imaged. 1. Left frontal scalp hematoma without acute intracranial hemorrhage. 2. Cerebral volume loss and white matter findings compatible with chronic small vessel ischemic disease. CT MAXILLOFACIAL WO CONT    Result Date: 3/21/2022  MAXILLOFACIAL CT WITHOUT CONTRAST HISTORY:  fall head trauma, bruising to bilateral eyesq TECHNIQUE: Noncontrast axial images were obtained through the face. Multiplanar reformatted images were generated. All CT scans at this facility used dose modulation, iterative reconstruction and/or weight based dosing when appropriate to reduce radiation dose to as low as reasonably achievable. COMPARISON:  None. FINDINGS:  There is no displaced facial fracture. Orbital floors are intact. Mandibular condyles are in a normal position. Bilateral TMJ osteoarthritis is present. The paranasal sinuses and mastoid air cells are well aerated. Soft tissue swelling is present in the supraorbital left frontal scalp. No displaced facial fracture. CT SPINE CERV WO CONT    Result Date: 3/21/2022  CT CERVICAL SPINE WITHOUT CONTRAST HISTORY:  Fall Trauma; 2 days after fall out of bed, presents with bruising to bilateral eye, left forehead, and nose. Denies blood thinner. History of dementia, daughter states that she is at her baseline mentation. TECHNIQUE: Noncontrast axial images were obtained from the craniocervical junction through C7-T1. Multiplanar reformatted images were generated. All CT scans at this facility used dose modulation, iterative reconstruction and/or weight based dosing when appropriate to reduce radiation dose to as low as reasonably achievable. COMPARISON:  None FINDINGS:  Multilevel degenerative spondylosis is present with reversal of normal cervical lordosis. The cervical vertebrae are normal in height. The articular facets overlap appropriately. Uncovertebral osteophytes contribute to multilevel foraminal stenosis at the C3-C4, C4-C5, C5-C6 and C6-C7 levels. There is ossification of the bilateral stylohyoid ligaments. Axial images demonstrate no displaced cervical spine fracture. Included images of the lung apices are clear. There is distention of the esophagus at the thoracic inlet. 1. No acute findings in the cervical spine. 2. Multilevel degenerative spondylosis and foraminal stenosis. 3. Distention of the proximal most esophagus. Correlate with history. Patient stable for discharge home.   Blood work unchanged

## 2022-03-21 NOTE — DISCHARGE INSTRUCTIONS
Tylenol if needed for pain. For any persistent episodes of pain into next week, call primary care doctor for recheck. Recheck sooner for worse or worrisome symptoms.

## 2022-03-21 NOTE — ED TRIAGE NOTES
Pt presents 2 days after fall out of bed, presents with bruising to bilateral eye, left forehead, and nose. Denies blood thinner. History of dementia, daughter states that she is at her baseline mentation.

## 2022-08-10 RX ORDER — QUETIAPINE FUMARATE 50 MG/1
50 TABLET, FILM COATED ORAL 2 TIMES DAILY
Qty: 60 TABLET | Refills: 5 | Status: SHIPPED | OUTPATIENT
Start: 2022-08-10 | End: 2022-08-11 | Stop reason: SDUPTHER

## 2022-08-10 NOTE — TELEPHONE ENCOUNTER
Spoke with the patients daughter and let her know, I would let the doctor know her mom needs a refill on her Seroquel. And I will let her know when he sends it for her.

## 2022-08-11 RX ORDER — QUETIAPINE FUMARATE 50 MG/1
50 TABLET, FILM COATED ORAL 2 TIMES DAILY
Qty: 60 TABLET | Refills: 5 | Status: SHIPPED | OUTPATIENT
Start: 2022-08-11

## 2022-11-21 ENCOUNTER — TELEMEDICINE (OUTPATIENT)
Dept: PRIMARY CARE CLINIC | Facility: CLINIC | Age: 87
End: 2022-11-21
Payer: MEDICARE

## 2022-11-21 DIAGNOSIS — F03.A0 MILD DEMENTIA WITHOUT BEHAVIORAL DISTURBANCE, PSYCHOTIC DISTURBANCE, MOOD DISTURBANCE, OR ANXIETY, UNSPECIFIED DEMENTIA TYPE: ICD-10-CM

## 2022-11-21 DIAGNOSIS — R32 URINARY AND BOWEL INCONTINENCE: ICD-10-CM

## 2022-11-21 DIAGNOSIS — R54 AGE-RELATED PHYSICAL DEBILITY: ICD-10-CM

## 2022-11-21 DIAGNOSIS — R15.9 URINARY AND BOWEL INCONTINENCE: ICD-10-CM

## 2022-11-21 DIAGNOSIS — Z71.89 ACP (ADVANCE CARE PLANNING): ICD-10-CM

## 2022-11-21 DIAGNOSIS — E44.0 PROTEIN-CALORIE MALNUTRITION, MODERATE (HCC): Primary | ICD-10-CM

## 2022-11-21 PROCEDURE — 99214 OFFICE O/P EST MOD 30 MIN: CPT | Performed by: FAMILY MEDICINE

## 2022-11-21 PROCEDURE — 1123F ACP DISCUSS/DSCN MKR DOCD: CPT | Performed by: FAMILY MEDICINE

## 2022-11-21 RX ORDER — BENAZEPRIL HYDROCHLORIDE 10 MG/1
10 TABLET ORAL DAILY
Qty: 90 TABLET | Refills: 5 | Status: SHIPPED | OUTPATIENT
Start: 2022-11-21

## 2022-11-21 RX ORDER — AMLODIPINE BESYLATE 5 MG/1
5 TABLET ORAL DAILY
Qty: 90 TABLET | Refills: 5 | Status: SHIPPED | OUTPATIENT
Start: 2022-11-21

## 2022-11-21 RX ORDER — MIRTAZAPINE 15 MG/1
15 TABLET, ORALLY DISINTEGRATING ORAL NIGHTLY
Qty: 90 TABLET | Refills: 5 | Status: SHIPPED | OUTPATIENT
Start: 2022-11-21

## 2022-11-21 RX ORDER — AMOXICILLIN 250 MG
CAPSULE ORAL
Qty: 60 TABLET | Refills: 2 | Status: SHIPPED | OUTPATIENT
Start: 2022-11-21

## 2022-11-21 RX ORDER — QUETIAPINE FUMARATE 50 MG/1
50 TABLET, FILM COATED ORAL 2 TIMES DAILY
Qty: 60 TABLET | Refills: 5 | Status: SHIPPED | OUTPATIENT
Start: 2022-11-21

## 2022-11-21 ASSESSMENT — ENCOUNTER SYMPTOMS
ABDOMINAL DISTENTION: 0
SHORTNESS OF BREATH: 0
EYE REDNESS: 0
EYE DISCHARGE: 0
DIARRHEA: 0
SINUS PRESSURE: 0
CONSTIPATION: 0
EYE PAIN: 0
WHEEZING: 0
VOMITING: 0
SORE THROAT: 0
VOICE CHANGE: 0
ABDOMINAL PAIN: 0
RHINORRHEA: 0
CHEST TIGHTNESS: 0
BACK PAIN: 0
SINUS PAIN: 0
PHOTOPHOBIA: 0
CHOKING: 0
BLOOD IN STOOL: 0
NAUSEA: 0
COLOR CHANGE: 0
COUGH: 0
TROUBLE SWALLOWING: 0

## 2022-11-21 ASSESSMENT — PATIENT HEALTH QUESTIONNAIRE - PHQ9
2. FEELING DOWN, DEPRESSED OR HOPELESS: 0
1. LITTLE INTEREST OR PLEASURE IN DOING THINGS: 0
SUM OF ALL RESPONSES TO PHQ QUESTIONS 1-9: 0
SUM OF ALL RESPONSES TO PHQ QUESTIONS 1-9: 0
SUM OF ALL RESPONSES TO PHQ9 QUESTIONS 1 & 2: 0
SUM OF ALL RESPONSES TO PHQ QUESTIONS 1-9: 0
SUM OF ALL RESPONSES TO PHQ QUESTIONS 1-9: 0

## 2022-11-21 NOTE — PATIENT INSTRUCTIONS
Stop pravastatin    Medication as directed    Incontinent supplies will be appropriate and possible perforated

## 2022-11-21 NOTE — PROGRESS NOTES
Virtual visit using audiovisual.  She is at home with her daughter who is primary caregiver. General debility weight loss    Protein calorie malnutrition mild dementia difficulty getting around. Urinary bowel incontinence using adult diapers. No suggestion of any abuse. Otherwise cheerful no significant functional decline change in vision or hearing. Healthcare directives advance planning papers given ACP consult. We will also arrange a durable DNR should be appropriate to discuss with family. Given refill on medication may stop her pravastatin and oxybutynin and minimal benefit for diagnosis nutrition supplement mirtazapine refills given    Review of Systems   Constitutional:  Negative for activity change, appetite change, chills, diaphoresis, fatigue, fever and unexpected weight change. HENT:  Negative for congestion, ear pain, hearing loss, nosebleeds, rhinorrhea, sinus pressure, sinus pain, sore throat, trouble swallowing and voice change. Eyes:  Negative for photophobia, pain, discharge, redness and visual disturbance. Respiratory:  Negative for cough, choking, chest tightness, shortness of breath and wheezing. Cardiovascular:  Negative for chest pain, palpitations and leg swelling. Gastrointestinal:  Negative for abdominal distention, abdominal pain, blood in stool, constipation, diarrhea, nausea and vomiting. Endocrine: Negative for cold intolerance, heat intolerance, polydipsia, polyphagia and polyuria. Genitourinary:  Positive for enuresis. Negative for difficulty urinating, dysuria, frequency, genital sores, hematuria, urgency and vaginal bleeding. Musculoskeletal:  Positive for gait problem. Negative for arthralgias, back pain, joint swelling, myalgias and neck pain. Skin:  Negative for color change and rash. Allergic/Immunologic: Negative for environmental allergies and food allergies.    Neurological:  Negative for dizziness, tremors, seizures, syncope, speech difficulty, weakness, numbness and headaches. Hematological:  Negative for adenopathy. Does not bruise/bleed easily. Psychiatric/Behavioral:  Negative for behavioral problems, confusion, decreased concentration, dysphoric mood, hallucinations, self-injury, sleep disturbance and suicidal ideas. The patient is not nervous/anxious. Physical Exam  Vitals and nursing note reviewed. Exam conducted with a chaperone present. Constitutional:       General: She is not in acute distress. Appearance: She is not ill-appearing or toxic-appearing. Comments: General debility however unknown change in functional status   HENT:      Head: Normocephalic and atraumatic. Right Ear: External ear normal.      Left Ear: External ear normal.      Nose: Nose normal. No congestion or rhinorrhea. Mouth/Throat:      Mouth: Mucous membranes are moist.      Pharynx: No oropharyngeal exudate. Eyes:      General: No scleral icterus. Right eye: No discharge. Left eye: No discharge. Extraocular Movements: Extraocular movements intact. Conjunctiva/sclera: Conjunctivae normal.      Pupils: Pupils are equal, round, and reactive to light. Cardiovascular:      Rate and Rhythm: Normal rate and regular rhythm. Pulses: Normal pulses. Heart sounds: Normal heart sounds. No murmur heard. No gallop. Pulmonary:      Effort: Pulmonary effort is normal. No respiratory distress. Breath sounds: Normal breath sounds. No stridor. No wheezing, rhonchi or rales. Chest:      Chest wall: No tenderness. Abdominal:      General: Abdomen is flat. There is no distension. Palpations: Abdomen is soft. There is no mass. Tenderness: There is no abdominal tenderness. There is no right CVA tenderness, guarding or rebound. Hernia: No hernia is present. Genitourinary:     Vagina: No vaginal discharge. Musculoskeletal:         General: No swelling, tenderness, deformity or signs of injury.  Normal range of motion. Cervical back: Normal range of motion and neck supple. No rigidity. Right lower leg: No edema. Left lower leg: No edema. Lymphadenopathy:      Cervical: No cervical adenopathy. Skin:     General: Skin is warm. Capillary Refill: Capillary refill takes less than 2 seconds. Coloration: Skin is not jaundiced or pale. Findings: No bruising, erythema, lesion or rash. Neurological:      General: No focal deficit present. Mental Status: She is alert and oriented to person, place, and time. Mental status is at baseline. Cranial Nerves: No cranial nerve deficit. Motor: No weakness. Coordination: Coordination normal.      Gait: Gait abnormal.   Psychiatric:         Mood and Affect: Mood normal.         Behavior: Behavior normal.      Comments: Cheerful, poor general weight however no significant change. Denies any bedsores. Agitation insomnia improved        1. Protein-calorie malnutrition, moderate ([address]  - [facility] - Referral to ACP Clinical Specialist  Continue nutritional supplements management advance care planning  2. Age-related physical debility  Activity as tolerated continue supplies  - Diapers & Supplies MISC; 1 each by Does not apply route daily as needed (Urinary bowel incontinence)  Dispense: 100 each; Refill: 5  - [facility] - Referral to ACP Clinical Specialist    3. Mild dementia without behavioral disturbance, psychotic disturbance, mood disturbance, or anxiety, unspecified dementia type    - Diapers & Supplies MISC; 1 each by Does not apply route daily as needed (Urinary bowel incontinence)  Dispense: 100 each; Refill: 5  - [facility] - Referral to ACP Clinical Specialist    4. Body mass index less than 19, adult      5. Urinary and bowel incontinence    - Diapers & Supplies MISC; 1 each by Does not apply route daily as needed (Urinary bowel incontinence)  Dispense: 100 each; Refill: 5    6.  ACP (advance care planning)    [facility] - Referral to ACP Clinical Specialist  Preventative care discussed flu shot recommended  Consent:  She and/or her healthcare decision maker is aware that this patient-initiated Telehealth encounter is a billable service, with coverage as determined by her insurance carrier. She is aware that she may receive a bill and has provided verbal consent to proceed: Yes    This virtual visit was conducted via Functional Neuromodulation. Pursuant to the emergency declaration under the [facility], [facility] waiver authority and the [employer] Act, this Virtual  Visit was conducted to reduce the patient's risk of exposure to COVID-19 and provide continuity of care for an established patient. Services were provided through a video synchronous discussion virtually to substitute for in-person clinic visit. Due to this being a TeleHealth evaluation, many elements of the physical examination are unable to be assessed. Total Time: minutes: 25-30 minutes.    Ronni Rose MD

## 2022-11-22 ENCOUNTER — CLINICAL DOCUMENTATION (OUTPATIENT)
Dept: CARE COORDINATION | Facility: CLINIC | Age: 87
End: 2022-11-22

## 2022-11-23 ENCOUNTER — TELEPHONE (OUTPATIENT)
Dept: PRIMARY CARE CLINIC | Facility: CLINIC | Age: 87
End: 2022-11-23

## 2022-11-23 NOTE — ACP (ADVANCE CARE PLANNING)
Advance Care Planning   Ambulatory ACP Specialist Patient Outreach    Date:  11/22/2022, 11/30/2022  ACP Specialist:  Melody Khan    Outreach call to patient in follow-up to ACP Specialist referral from: Ana Dennis MD    [x] PCP  [] Provider   [] Ambulatory Care Management [] Other for Reason:    [x] Advance Directive Assistance  [] Code Status Discussion  [] Complete Portable DNR Order  [] Discuss Goals of Care  [] Complete POST/MOST  [] Early ACP Decision-Making  [x] Other    Date Referral Received:11/22/2022    Today's Outreach:  [x] First   [x] Second  [] Third                               Third outreach made by []  phone  [] email []   Drop â€™til you Shopt     Intervention:  [] Spoke with Patient  [x] Left VM requesting return call      Outcome:LVM requesting return call. If no response will follow again in one week. 11/30/2022  Contacted the number listed for the patient to offer ACP conversation. Spoke with someone who identified herself as a daughter, and stated that she handles her mothers affairs and asked for explanation of ACP conversation. After explanation she stated that her family makes decisions as a group and declined further conversation. Will mail closure letter offering assistance if needed and close the referral.      Next Step:   [] ACP scheduled conversation  [x] Outreach again in one week               [] Email / Mail ACP Info Sheets  [] Email / Mail Advance Directive            [] Close Referral. Routing closure to referring provider/staff and to ACP Specialist . [] Closure Letter mailed to Patient with Invitation to Contact ACP Specialist if/when ready.     Thank you for this referral.

## 2022-11-30 ENCOUNTER — CLINICAL DOCUMENTATION (OUTPATIENT)
Dept: CARE COORDINATION | Facility: CLINIC | Age: 87
End: 2022-11-30

## 2023-03-08 ENCOUNTER — TELEPHONE (OUTPATIENT)
Dept: PRIMARY CARE CLINIC | Facility: CLINIC | Age: 88
End: 2023-03-08

## 2023-03-08 ENCOUNTER — TELEMEDICINE (OUTPATIENT)
Dept: PRIMARY CARE CLINIC | Facility: CLINIC | Age: 88
End: 2023-03-08
Payer: MEDICARE

## 2023-03-08 DIAGNOSIS — E44.0 PROTEIN-CALORIE MALNUTRITION, MODERATE (HCC): ICD-10-CM

## 2023-03-08 DIAGNOSIS — F03.A0 MILD DEMENTIA WITHOUT BEHAVIORAL DISTURBANCE, PSYCHOTIC DISTURBANCE, MOOD DISTURBANCE, OR ANXIETY, UNSPECIFIED DEMENTIA TYPE: ICD-10-CM

## 2023-03-08 DIAGNOSIS — R15.9 URINARY AND BOWEL INCONTINENCE: ICD-10-CM

## 2023-03-08 DIAGNOSIS — I11.9 HYPERTENSIVE HEART DISEASE WITHOUT HF (HEART FAILURE): ICD-10-CM

## 2023-03-08 DIAGNOSIS — R54 AGE-RELATED PHYSICAL DEBILITY: ICD-10-CM

## 2023-03-08 DIAGNOSIS — F51.04 PSYCHOPHYSIOLOGICAL INSOMNIA: Primary | ICD-10-CM

## 2023-03-08 DIAGNOSIS — R32 URINARY AND BOWEL INCONTINENCE: ICD-10-CM

## 2023-03-08 PROBLEM — K59.00 CONSTIPATION: Status: ACTIVE | Noted: 2022-05-03

## 2023-03-08 PROCEDURE — 99214 OFFICE O/P EST MOD 30 MIN: CPT | Performed by: FAMILY MEDICINE

## 2023-03-08 PROCEDURE — 1123F ACP DISCUSS/DSCN MKR DOCD: CPT | Performed by: FAMILY MEDICINE

## 2023-03-08 RX ORDER — QUETIAPINE FUMARATE 50 MG/1
50 TABLET, FILM COATED ORAL 2 TIMES DAILY
Qty: 60 TABLET | Refills: 5 | Status: SHIPPED | OUTPATIENT
Start: 2023-03-08

## 2023-03-08 RX ORDER — AMLODIPINE BESYLATE 5 MG/1
5 TABLET ORAL DAILY
Qty: 90 TABLET | Refills: 5 | Status: SHIPPED | OUTPATIENT
Start: 2023-03-08

## 2023-03-08 RX ORDER — MIRTAZAPINE 15 MG/1
15 TABLET, ORALLY DISINTEGRATING ORAL NIGHTLY
Qty: 90 TABLET | Refills: 5 | Status: SHIPPED | OUTPATIENT
Start: 2023-03-08

## 2023-03-08 RX ORDER — AMOXICILLIN 250 MG
CAPSULE ORAL
Qty: 60 TABLET | Refills: 2 | Status: SHIPPED | OUTPATIENT
Start: 2023-03-08

## 2023-03-08 RX ORDER — BENAZEPRIL HYDROCHLORIDE 10 MG/1
10 TABLET ORAL DAILY
Qty: 90 TABLET | Refills: 5 | Status: SHIPPED | OUTPATIENT
Start: 2023-03-08

## 2023-03-08 ASSESSMENT — ENCOUNTER SYMPTOMS
COLOR CHANGE: 0
BACK PAIN: 0
ABDOMINAL PAIN: 0
RESPIRATORY NEGATIVE: 1
WHEEZING: 0
EYES NEGATIVE: 1
SHORTNESS OF BREATH: 0

## 2023-03-08 ASSESSMENT — PATIENT HEALTH QUESTIONNAIRE - PHQ9
SUM OF ALL RESPONSES TO PHQ QUESTIONS 1-9: 0
SUM OF ALL RESPONSES TO PHQ QUESTIONS 1-9: 0
SUM OF ALL RESPONSES TO PHQ9 QUESTIONS 1 & 2: 0
1. LITTLE INTEREST OR PLEASURE IN DOING THINGS: 0
2. FEELING DOWN, DEPRESSED OR HOPELESS: 0
SUM OF ALL RESPONSES TO PHQ QUESTIONS 1-9: 0
SUM OF ALL RESPONSES TO PHQ QUESTIONS 1-9: 0

## 2023-03-08 NOTE — PROGRESS NOTES
Follow-up of virtual visit. Has numerous medical problems including self-care deficit memory impairment history urinary incontinence. Her daughter who is primary caregiver thinks that she is not sleeping well. Discussed sleep hygiene medications. She will try over-the-counter melatonin. I have discussed risk of Seroquel and recommended not increasing the dose though possibility of increasing. Given refill on incontinence supplies. She was quite cheerful and functional status has not changed no fall injuries. Medication reviewed discussed refills given. No change in memory vision hearing and she was able to answer questions appropriately    Review of Systems   Constitutional:  Negative for activity change, appetite change, chills, fatigue, fever and unexpected weight change. HENT: Negative. Negative for congestion and hearing loss. Eyes: Negative. Respiratory: Negative. Negative for shortness of breath and wheezing. Cardiovascular: Negative. Gastrointestinal:  Negative for abdominal pain. Endocrine: Negative. Genitourinary:  Positive for enuresis. Negative for vaginal bleeding. Musculoskeletal:  Positive for gait problem. Negative for arthralgias and back pain. Skin: Negative. Negative for color change, rash and wound. Neurological:  Positive for headaches. Negative for dizziness, tremors, syncope, speech difficulty and weakness. Psychiatric/Behavioral:  Positive for sleep disturbance. Negative for agitation, behavioral problems, confusion, dysphoric mood and self-injury. The patient is not nervous/anxious. Physical Exam  Vitals and nursing note reviewed. Exam conducted with a chaperone present. Constitutional:       General: She is not in acute distress. Appearance: Normal appearance. She is not ill-appearing or toxic-appearing. Comments: Low weight are however stable    HENT:      Head: Normocephalic and atraumatic.       Right Ear: External ear normal. Left Ear: External ear normal.      Nose: Nose normal. No congestion or rhinorrhea. Mouth/Throat:      Mouth: Mucous membranes are moist.      Pharynx: No oropharyngeal exudate. Eyes:      General: No scleral icterus. Right eye: No discharge. Left eye: No discharge. Extraocular Movements: Extraocular movements intact. Conjunctiva/sclera: Conjunctivae normal.      Pupils: Pupils are equal, round, and reactive to light. Cardiovascular:      Rate and Rhythm: Normal rate and regular rhythm. Pulses: Normal pulses. Heart sounds: Normal heart sounds. No murmur heard. No gallop. Pulmonary:      Effort: Pulmonary effort is normal. No respiratory distress. Breath sounds: Normal breath sounds. No stridor. No wheezing, rhonchi or rales. Chest:      Chest wall: No tenderness. Abdominal:      General: Abdomen is flat. There is no distension. Palpations: Abdomen is soft. There is no mass. Tenderness: There is no abdominal tenderness. There is no right CVA tenderness, guarding or rebound. Hernia: No hernia is present. Genitourinary:     Vagina: No vaginal discharge. Musculoskeletal:         General: No swelling, tenderness, deformity or signs of injury. Normal range of motion. Cervical back: Normal range of motion and neck supple. No rigidity. Right lower leg: No edema. Left lower leg: No edema. Lymphadenopathy:      Cervical: No cervical adenopathy. Skin:     General: Skin is warm. Capillary Refill: Capillary refill takes less than 2 seconds. Coloration: Skin is not jaundiced or pale. Findings: No bruising, erythema, lesion or rash. Neurological:      General: No focal deficit present. Mental Status: She is alert and oriented to person, place, and time. Mental status is at baseline. Cranial Nerves: No cranial nerve deficit. Motor: No weakness.       Coordination: Coordination normal.      Gait: Gait normal.   Psychiatric:         Mood and Affect: Mood normal.         Behavior: Behavior normal.        1. Psychophysiological insomnia    Discussed sleep hygiene. We will avoid increasing the dose of Seroquel. Caregiver will try melatonin  2. Hypertensive heart disease without HF (heart failure)  No orthopnea paroxysmal nocturnal dyspnea and  3. 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 Protein-calorie malnutrition, moderate (HCC)    Nutritional supplement  5. 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 Urinary and bowel incontinence  Will complete papers for incontinence supplies  - Diapers & Supplies MISC; 1 each by Does not apply route daily as needed (Urinary bowel incontinence)  Dispense: 100 each; Refill: 5     Discussed fall prevention healthcare directives at [facility]    Consent:  She and/or her healthcare decision maker is aware that this patient-initiated Telehealth encounter is a billable service, with coverage as determined by her insurance carrier. She is aware that she may receive a bill and has provided verbal consent to proceed: Yes    This virtual visit was conducted via [address] video assisted by daughter. Patient is at home primary caregiver. In office pursuant to the emergency declaration under the [facility], 1135 waiver authority and the [employer] and [employer] Act, this Virtual  Visit was conducted to reduce the patient's risk of exposure to COVID-19 and provide continuity of care for an established patient.   Services were provided through a video synchronous discussion virtually to substitute for in-person clinic visit. Due to this being a TeleHealth evaluation, many elements of the physical examination are unable to be assessed. Total Time: minutes: 20-25 minutes.    Sharda Lezama MD

## 2023-11-07 ENCOUNTER — TELEMEDICINE (OUTPATIENT)
Dept: PRIMARY CARE CLINIC | Facility: CLINIC | Age: 88
End: 2023-11-07
Payer: MEDICARE

## 2023-11-07 DIAGNOSIS — R32 URINARY AND BOWEL INCONTINENCE: ICD-10-CM

## 2023-11-07 DIAGNOSIS — R54 AGE-RELATED PHYSICAL DEBILITY: ICD-10-CM

## 2023-11-07 DIAGNOSIS — I48.0 PAROXYSMAL ATRIAL FIBRILLATION (HCC): ICD-10-CM

## 2023-11-07 DIAGNOSIS — R15.9 URINARY AND BOWEL INCONTINENCE: ICD-10-CM

## 2023-11-07 DIAGNOSIS — I11.9 HYPERTENSIVE HEART DISEASE WITHOUT HF (HEART FAILURE): Primary | ICD-10-CM

## 2023-11-07 DIAGNOSIS — F03.A0 MILD DEMENTIA WITHOUT BEHAVIORAL DISTURBANCE, PSYCHOTIC DISTURBANCE, MOOD DISTURBANCE, OR ANXIETY, UNSPECIFIED DEMENTIA TYPE (HCC): ICD-10-CM

## 2023-11-07 DIAGNOSIS — E44.0 PROTEIN-CALORIE MALNUTRITION, MODERATE (HCC): ICD-10-CM

## 2023-11-07 PROCEDURE — 99442 PR PHYS/QHP TELEPHONE EVALUATION 11-20 MIN: CPT | Performed by: FAMILY MEDICINE

## 2023-11-07 RX ORDER — QUETIAPINE FUMARATE 50 MG/1
50 TABLET, FILM COATED ORAL 2 TIMES DAILY
Qty: 60 TABLET | Refills: 5 | Status: SHIPPED | OUTPATIENT
Start: 2023-11-07

## 2023-11-07 RX ORDER — BENAZEPRIL HYDROCHLORIDE 10 MG/1
10 TABLET ORAL DAILY
Qty: 90 TABLET | Refills: 5 | Status: SHIPPED | OUTPATIENT
Start: 2023-11-07

## 2023-11-07 RX ORDER — POLYETHYLENE GLYCOL 3350 17 G/17G
17 POWDER, FOR SOLUTION ORAL DAILY
Qty: 578 G | Refills: 2 | Status: SHIPPED | OUTPATIENT
Start: 2023-11-07

## 2023-11-07 RX ORDER — AMLODIPINE BESYLATE 5 MG/1
5 TABLET ORAL DAILY
Qty: 90 TABLET | Refills: 5 | Status: SHIPPED | OUTPATIENT
Start: 2023-11-07

## 2023-11-07 RX ORDER — MIRTAZAPINE 15 MG/1
15 TABLET, ORALLY DISINTEGRATING ORAL NIGHTLY
Qty: 90 TABLET | Refills: 5 | Status: SHIPPED | OUTPATIENT
Start: 2023-11-07

## 2023-11-07 RX ORDER — AMOXICILLIN 250 MG
CAPSULE ORAL
Qty: 60 TABLET | Refills: 2 | Status: SHIPPED | OUTPATIENT
Start: 2023-11-07

## 2023-11-07 ASSESSMENT — ENCOUNTER SYMPTOMS
WHEEZING: 0
EYES NEGATIVE: 1
RESPIRATORY NEGATIVE: 1
COLOR CHANGE: 0
ABDOMINAL PAIN: 0
SHORTNESS OF BREATH: 0

## 2023-11-07 NOTE — PROGRESS NOTES
80year-old geriatric. She lives with her daughter who is primary caregiver. Memory impairment however stable denies any weight loss bedsores numerous medical problems stable. Refill on medication medication supplement    Review of Systems   Constitutional:  Negative for activity change, appetite change, chills, fatigue and unexpected weight change. HENT: Negative. Negative for congestion. Eyes: Negative. Respiratory: Negative. Negative for shortness of breath and wheezing. Cardiovascular: Negative. Gastrointestinal:  Negative for abdominal pain. Endocrine: Negative. Genitourinary: Negative. Musculoskeletal: Negative. Skin: Negative. Negative for color change. Neurological:  Positive for headaches. Negative for dizziness, tremors, syncope, speech difficulty and weakness. Psychiatric/Behavioral:  Negative for agitation and dysphoric mood. Physical Exam  Pulmonary:      Effort: Pulmonary effort is normal.   Neurological:      Mental Status: She is alert. Limited evaluation by using telephone only. Patient was able to communicate and expressed that she is feeling well. According to daughter no deterioration of memory function current medications seem to be helping    1. Hypertensive heart disease without HF (heart failure)  Controlled on medication continue    2. Paroxysmal atrial fibrillation (HCC)      3. Mild dementia without behavioral disturbance, psychotic disturbance, mood disturbance, or anxiety, unspecified dementia type (HCC)  Stable no change in functional status  - Diapers & Supplies MISC; 1 each by Does not apply route daily as needed (Urinary bowel incontinence)  Dispense: 100 each; Refill: 5    4. Protein-calorie malnutrition, moderate (HCC)  Denies any weight loss stable continue nutrition supplement mirtazapine    5.  Age-related physical debility  - Diapers & Supplies MISC; 1 each by Does not apply route daily as needed (Urinary bowel incontinence)

## 2023-12-07 ENCOUNTER — TELEPHONE (OUTPATIENT)
Dept: PRIMARY CARE CLINIC | Facility: CLINIC | Age: 88
End: 2023-12-07

## 2023-12-07 NOTE — TELEPHONE ENCOUNTER
Patient daughter Iván Nix called requesting a call back regarding regarding a paper work for Jasmyne you please contact her [phone]. Daughter is asking for an appointment NP virtual.    Thank you

## 2023-12-12 ENCOUNTER — TELEPHONE (OUTPATIENT)
Dept: PRIMARY CARE CLINIC | Facility: CLINIC | Age: 88
End: 2023-12-12

## 2023-12-13 ENCOUNTER — TELEPHONE (OUTPATIENT)
Dept: PRIMARY CARE CLINIC | Facility: CLINIC | Age: 88
End: 2023-12-13

## 2023-12-13 ENCOUNTER — TELEMEDICINE (OUTPATIENT)
Dept: PRIMARY CARE CLINIC | Facility: CLINIC | Age: 88
End: 2023-12-13
Payer: MEDICARE

## 2023-12-13 DIAGNOSIS — R35.0 URINARY FREQUENCY: Primary | ICD-10-CM

## 2023-12-13 DIAGNOSIS — R32 URINARY INCONTINENCE, UNSPECIFIED TYPE: ICD-10-CM

## 2023-12-13 PROCEDURE — 1123F ACP DISCUSS/DSCN MKR DOCD: CPT | Performed by: INTERNAL MEDICINE

## 2023-12-13 PROCEDURE — 99215 OFFICE O/P EST HI 40 MIN: CPT | Performed by: INTERNAL MEDICINE

## 2023-12-13 RX ORDER — AMOXICILLIN 250 MG
CAPSULE ORAL
Qty: 180 TABLET | Refills: 0 | Status: SHIPPED | OUTPATIENT
Start: 2023-12-13

## 2023-12-13 NOTE — PROGRESS NOTES
mouth daily 90 tablet 5    Diapers & Supplies MISC 1 each by Does not apply route daily as needed (Urinary bowel incontinence) 100 each 5    Nutritional Supplements (ENSURE ACTIVE) LIQD 1 twice a day as tolerated 60 each 5    QUEtiapine (SEROQUEL) 50 MG tablet Take 1 tablet by mouth 2 times daily 1-2 bedtime and can take an additional during the day as needed, 60 tablet 5    polyethylene glycol (GLYCOLAX) 17 GM/SCOOP powder Take 17 g by mouth daily 578 g 2    aspirin 81 MG EC tablet Take 1 tablet by mouth daily      mirtazapine (REMERON SOL-TAB) 15 MG disintegrating tablet Take 1 tablet by mouth nightly (Patient not taking: Reported on 12/13/2023) 90 tablet 5     No current facility-administered medications for this visit. Allergies as of 12/13/2023    (No Known Allergies)       Review of Systems    Objective: There were no vitals taken for this visit. Physical Exam    No results found for this visit on 12/13/23. Assessent & Plan    1. Urinary frequency  2. Urinary incontinence, unspecified type     Memory impairment    Hypertensive heart disease without HF   PAF  Urinary incont will give supplies  Protein Malnutrition kamryn villa  Age related debility home bound  Urinary incontenece- supplies given to patient forms filled    Filled forms   Reviewed charts  Boyle Shown was evaluated through a synchronous (real-time) audio-video encounter, and/or her healthcare decision maker, is aware that it is a billable service, which includes applicable co-pays, with coverage as determined by her insurance carrier. She provided verbal consent to proceed and patient identification was verified. This visit was conducted pursuant to the emergency declaration under the [address] and the [employer] Act. A caregiver was present when appropriate. Ability to conduct physical exam was limited.  The patient

## 2023-12-15 ENCOUNTER — TELEPHONE (OUTPATIENT)
Dept: PRIMARY CARE CLINIC | Facility: CLINIC | Age: 88
End: 2023-12-15

## 2023-12-15 NOTE — TELEPHONE ENCOUNTER
Daughter, Remy Eisenberg, informed new forms to be signed by PCP on Monday then faxed to [address] verbalized understanding

## 2023-12-23 ENCOUNTER — APPOINTMENT (OUTPATIENT)
Dept: GENERAL RADIOLOGY | Age: 88
DRG: 177 | End: 2023-12-23
Payer: MEDICARE

## 2023-12-23 ENCOUNTER — HOSPITAL ENCOUNTER (EMERGENCY)
Age: 88
Discharge: HOME | DRG: 177 | End: 2023-12-23
Attending: EMERGENCY MEDICINE
Payer: MEDICARE

## 2023-12-23 VITALS
DIASTOLIC BLOOD PRESSURE: 59 MMHG | WEIGHT: 105 LBS | BODY MASS INDEX: 19.32 KG/M2 | HEART RATE: 87 BPM | TEMPERATURE: 98.3 F | SYSTOLIC BLOOD PRESSURE: 106 MMHG | OXYGEN SATURATION: 98 % | RESPIRATION RATE: 17 BRPM | HEIGHT: 62 IN

## 2023-12-23 DIAGNOSIS — J18.9 ATYPICAL PNEUMONIA: Primary | ICD-10-CM

## 2023-12-23 DIAGNOSIS — R73.9 HYPERGLYCEMIA: ICD-10-CM

## 2023-12-23 LAB
ALBUMIN SERPL-MCNC: 3.3 G/DL (ref 3.2–4.6)
ALBUMIN/GLOB SERPL: 1 (ref 0.4–1.6)
ALP SERPL-CCNC: 89 U/L (ref 50–136)
ALT SERPL-CCNC: 34 U/L (ref 12–65)
ANION GAP SERPL CALC-SCNC: 4 MMOL/L (ref 2–11)
AST SERPL-CCNC: 28 U/L (ref 15–37)
BASOPHILS # BLD: 0 K/UL (ref 0–0.2)
BASOPHILS NFR BLD: 0 % (ref 0–2)
BILIRUB SERPL-MCNC: 0.3 MG/DL (ref 0.2–1.1)
BUN SERPL-MCNC: 25 MG/DL (ref 8–23)
CALCIUM SERPL-MCNC: 8.3 MG/DL (ref 8.3–10.4)
CHLORIDE SERPL-SCNC: 108 MMOL/L (ref 103–113)
CO2 SERPL-SCNC: 26 MMOL/L (ref 21–32)
CREAT SERPL-MCNC: 1.3 MG/DL (ref 0.6–1)
DIFFERENTIAL METHOD BLD: ABNORMAL
EOSINOPHIL # BLD: 0 K/UL (ref 0–0.8)
EOSINOPHIL NFR BLD: 0 % (ref 0.5–7.8)
ERYTHROCYTE [DISTWIDTH] IN BLOOD BY AUTOMATED COUNT: 13.9 % (ref 11.9–14.6)
FLUAV RNA SPEC QL NAA+PROBE: NOT DETECTED
FLUBV RNA SPEC QL NAA+PROBE: NOT DETECTED
GLOBULIN SER CALC-MCNC: 3.3 G/DL (ref 2.8–4.5)
GLUCOSE SERPL-MCNC: 224 MG/DL (ref 65–100)
HCT VFR BLD AUTO: 36.2 % (ref 35.8–46.3)
HGB BLD-MCNC: 11.2 G/DL (ref 11.7–15.4)
IMM GRANULOCYTES # BLD AUTO: 0 K/UL (ref 0–0.5)
IMM GRANULOCYTES NFR BLD AUTO: 0 % (ref 0–5)
LACTATE SERPL-SCNC: 1.6 MMOL/L (ref 0.4–2)
LYMPHOCYTES # BLD: 0.3 K/UL (ref 0.5–4.6)
LYMPHOCYTES NFR BLD: 5 % (ref 13–44)
MAGNESIUM SERPL-MCNC: 2.2 MG/DL (ref 1.8–2.4)
MCH RBC QN AUTO: 32.3 PG (ref 26.1–32.9)
MCHC RBC AUTO-ENTMCNC: 30.9 G/DL (ref 31.4–35)
MCV RBC AUTO: 104.3 FL (ref 82–102)
MONOCYTES # BLD: 0.4 K/UL (ref 0.1–1.3)
MONOCYTES NFR BLD: 7 % (ref 4–12)
NEUTS SEG # BLD: 5 K/UL (ref 1.7–8.2)
NEUTS SEG NFR BLD: 88 % (ref 43–78)
NRBC # BLD: 0 K/UL (ref 0–0.2)
NT PRO BNP: 364 PG/ML
PLATELET # BLD AUTO: 178 K/UL (ref 150–450)
PMV BLD AUTO: 11.2 FL (ref 9.4–12.3)
POTASSIUM SERPL-SCNC: 5.2 MMOL/L (ref 3.5–5.1)
PROT SERPL-MCNC: 6.6 G/DL (ref 6.3–8.2)
RBC # BLD AUTO: 3.47 M/UL (ref 4.05–5.2)
SARS-COV-2 RDRP RESP QL NAA+PROBE: NOT DETECTED
SODIUM SERPL-SCNC: 138 MMOL/L (ref 136–146)
SOURCE: NORMAL
TROPONIN I SERPL HS-MCNC: 8.5 PG/ML (ref 0–14)
WBC # BLD AUTO: 5.8 K/UL (ref 4.3–11.1)

## 2023-12-23 PROCEDURE — 2580000003 HC RX 258: Performed by: EMERGENCY MEDICINE

## 2023-12-23 PROCEDURE — 96375 TX/PRO/DX INJ NEW DRUG ADDON: CPT

## 2023-12-23 PROCEDURE — 85025 COMPLETE CBC W/AUTO DIFF WBC: CPT

## 2023-12-23 PROCEDURE — 96365 THER/PROPH/DIAG IV INF INIT: CPT

## 2023-12-23 PROCEDURE — 87502 INFLUENZA DNA AMP PROBE: CPT

## 2023-12-23 PROCEDURE — 83880 ASSAY OF NATRIURETIC PEPTIDE: CPT

## 2023-12-23 PROCEDURE — 87040 BLOOD CULTURE FOR BACTERIA: CPT

## 2023-12-23 PROCEDURE — 87635 SARS-COV-2 COVID-19 AMP PRB: CPT

## 2023-12-23 PROCEDURE — 84484 ASSAY OF TROPONIN QUANT: CPT

## 2023-12-23 PROCEDURE — 99285 EMERGENCY DEPT VISIT HI MDM: CPT

## 2023-12-23 PROCEDURE — 6370000000 HC RX 637 (ALT 250 FOR IP): Performed by: EMERGENCY MEDICINE

## 2023-12-23 PROCEDURE — 83605 ASSAY OF LACTIC ACID: CPT

## 2023-12-23 PROCEDURE — 93005 ELECTROCARDIOGRAM TRACING: CPT | Performed by: EMERGENCY MEDICINE

## 2023-12-23 PROCEDURE — 6360000002 HC RX W HCPCS: Performed by: EMERGENCY MEDICINE

## 2023-12-23 PROCEDURE — 71045 X-RAY EXAM CHEST 1 VIEW: CPT

## 2023-12-23 PROCEDURE — 94640 AIRWAY INHALATION TREATMENT: CPT

## 2023-12-23 PROCEDURE — 80053 COMPREHEN METABOLIC PANEL: CPT

## 2023-12-23 PROCEDURE — 83735 ASSAY OF MAGNESIUM: CPT

## 2023-12-23 RX ORDER — IPRATROPIUM BROMIDE AND ALBUTEROL SULFATE 2.5; .5 MG/3ML; MG/3ML
1 SOLUTION RESPIRATORY (INHALATION)
Status: COMPLETED | OUTPATIENT
Start: 2023-12-23 | End: 2023-12-23

## 2023-12-23 RX ORDER — AZITHROMYCIN 250 MG/1
250 TABLET, FILM COATED ORAL SEE ADMIN INSTRUCTIONS
Qty: 6 TABLET | Refills: 0 | Status: ON HOLD | OUTPATIENT
Start: 2023-12-23 | End: 2023-12-26 | Stop reason: HOSPADM

## 2023-12-23 RX ORDER — AZITHROMYCIN 250 MG/1
250 TABLET, FILM COATED ORAL SEE ADMIN INSTRUCTIONS
Qty: 6 TABLET | Refills: 0 | Status: SHIPPED | OUTPATIENT
Start: 2023-12-23 | End: 2023-12-23

## 2023-12-23 RX ADMIN — IPRATROPIUM BROMIDE AND ALBUTEROL SULFATE 1 DOSE: 2.5; .5 SOLUTION RESPIRATORY (INHALATION) at 19:57

## 2023-12-23 RX ADMIN — WATER 1000 MG: 1 INJECTION INTRAMUSCULAR; INTRAVENOUS; SUBCUTANEOUS at 20:18

## 2023-12-23 RX ADMIN — AZITHROMYCIN MONOHYDRATE 500 MG: 500 INJECTION, POWDER, LYOPHILIZED, FOR SOLUTION INTRAVENOUS at 20:18

## 2023-12-23 NOTE — ED TRIAGE NOTES
Pt brought in by EMS from home c/o SOB x 20 mins. SOB started right after pt finished eating supper. No choking spell per family. Audible gurgle on inhalation and exhalation per EMS. Pt states she feels like something is caught in her throat. A&O 2/4 at baseline. GCS 13 at baseline.

## 2023-12-24 ENCOUNTER — APPOINTMENT (OUTPATIENT)
Dept: GENERAL RADIOLOGY | Age: 88
DRG: 177 | End: 2023-12-24
Payer: MEDICARE

## 2023-12-24 ENCOUNTER — HOSPITAL ENCOUNTER (INPATIENT)
Age: 88
LOS: 2 days | Discharge: HOME | DRG: 177 | End: 2023-12-26
Attending: GENERAL PRACTICE | Admitting: HOSPITALIST
Payer: MEDICARE

## 2023-12-24 DIAGNOSIS — J18.9 PNEUMONIA DUE TO INFECTIOUS ORGANISM, UNSPECIFIED LATERALITY, UNSPECIFIED PART OF LUNG: ICD-10-CM

## 2023-12-24 DIAGNOSIS — J11.1 INFLUENZA WITH RESPIRATORY MANIFESTATION OTHER THAN PNEUMONIA: ICD-10-CM

## 2023-12-24 DIAGNOSIS — R41.82 ALTERED MENTAL STATUS, UNSPECIFIED ALTERED MENTAL STATUS TYPE: Primary | ICD-10-CM

## 2023-12-24 DIAGNOSIS — U07.1 COVID-19 VIRUS INFECTION: ICD-10-CM

## 2023-12-24 DIAGNOSIS — R53.1 ACUTE WEAKNESS: ICD-10-CM

## 2023-12-24 PROBLEM — I10 HTN (HYPERTENSION): Status: ACTIVE | Noted: 2023-12-24

## 2023-12-24 PROBLEM — J10.1 INFLUENZA A WITH RESPIRATORY MANIFESTATIONS: Status: ACTIVE | Noted: 2023-12-24

## 2023-12-24 LAB
ALBUMIN SERPL-MCNC: 2.8 G/DL (ref 3.2–4.6)
ALBUMIN/GLOB SERPL: 0.9 (ref 0.4–1.6)
ALP SERPL-CCNC: 77 U/L (ref 50–136)
ALT SERPL-CCNC: 29 U/L (ref 12–65)
ANION GAP SERPL CALC-SCNC: 7 MMOL/L (ref 2–11)
AST SERPL-CCNC: 20 U/L (ref 15–37)
BASOPHILS # BLD: 0 K/UL (ref 0–0.2)
BASOPHILS NFR BLD: 0 % (ref 0–2)
BILIRUB SERPL-MCNC: 0.2 MG/DL (ref 0.2–1.1)
BUN SERPL-MCNC: 28 MG/DL (ref 8–23)
CALCIUM SERPL-MCNC: 8.1 MG/DL (ref 8.3–10.4)
CHLORIDE SERPL-SCNC: 109 MMOL/L (ref 103–113)
CO2 SERPL-SCNC: 22 MMOL/L (ref 21–32)
CREAT SERPL-MCNC: 1.1 MG/DL (ref 0.6–1)
CRP SERPL-MCNC: <0.3 MG/DL (ref 0–0.9)
DIFFERENTIAL METHOD BLD: ABNORMAL
EKG ATRIAL RATE: 99 BPM
EKG DIAGNOSIS: NORMAL
EKG P AXIS: 51 DEGREES
EKG P-R INTERVAL: 175 MS
EKG Q-T INTERVAL: 338 MS
EKG QRS DURATION: 83 MS
EKG QTC CALCULATION (BAZETT): 432 MS
EKG R AXIS: 17 DEGREES
EKG T AXIS: 198 DEGREES
EKG VENTRICULAR RATE: 98 BPM
EOSINOPHIL # BLD: 0 K/UL (ref 0–0.8)
EOSINOPHIL NFR BLD: 0 % (ref 0.5–7.8)
ERYTHROCYTE [DISTWIDTH] IN BLOOD BY AUTOMATED COUNT: 14.1 % (ref 11.9–14.6)
FLUAV RNA SPEC QL NAA+PROBE: DETECTED
FLUBV RNA SPEC QL NAA+PROBE: NOT DETECTED
GLOBULIN SER CALC-MCNC: 3.2 G/DL (ref 2.8–4.5)
GLUCOSE SERPL-MCNC: 236 MG/DL (ref 65–100)
HCT VFR BLD AUTO: 34.4 % (ref 35.8–46.3)
HGB BLD-MCNC: 10.6 G/DL (ref 11.7–15.4)
IMM GRANULOCYTES # BLD AUTO: 0 K/UL (ref 0–0.5)
IMM GRANULOCYTES NFR BLD AUTO: 0 % (ref 0–5)
LACTATE SERPL-SCNC: 1.2 MMOL/L (ref 0.4–2)
LYMPHOCYTES # BLD: 0.8 K/UL (ref 0.5–4.6)
LYMPHOCYTES NFR BLD: 16 % (ref 13–44)
MCH RBC QN AUTO: 32.3 PG (ref 26.1–32.9)
MCHC RBC AUTO-ENTMCNC: 30.8 G/DL (ref 31.4–35)
MCV RBC AUTO: 104.9 FL (ref 82–102)
MONOCYTES # BLD: 0.7 K/UL (ref 0.1–1.3)
MONOCYTES NFR BLD: 13 % (ref 4–12)
NEUTS SEG # BLD: 3.5 K/UL (ref 1.7–8.2)
NEUTS SEG NFR BLD: 71 % (ref 43–78)
NRBC # BLD: 0 K/UL (ref 0–0.2)
PLATELET # BLD AUTO: 146 K/UL (ref 150–450)
PMV BLD AUTO: 10.6 FL (ref 9.4–12.3)
POTASSIUM SERPL-SCNC: 4.6 MMOL/L (ref 3.5–5.1)
PROCALCITONIN SERPL-MCNC: 0.09 NG/ML (ref 0–0.49)
PROT SERPL-MCNC: 6 G/DL (ref 6.3–8.2)
RBC # BLD AUTO: 3.28 M/UL (ref 4.05–5.2)
RSV RNA NPH QL NAA+PROBE: NOT DETECTED
SARS-COV-2 RDRP RESP QL NAA+PROBE: DETECTED
SODIUM SERPL-SCNC: 138 MMOL/L (ref 136–146)
SOURCE: ABNORMAL
WBC # BLD AUTO: 5 K/UL (ref 4.3–11.1)

## 2023-12-24 PROCEDURE — 6360000002 HC RX W HCPCS: Performed by: HOSPITALIST

## 2023-12-24 PROCEDURE — 36415 COLL VENOUS BLD VENIPUNCTURE: CPT

## 2023-12-24 PROCEDURE — 83605 ASSAY OF LACTIC ACID: CPT

## 2023-12-24 PROCEDURE — 2580000003 HC RX 258: Performed by: GENERAL PRACTICE

## 2023-12-24 PROCEDURE — 86140 C-REACTIVE PROTEIN: CPT

## 2023-12-24 PROCEDURE — 1100000000 HC RM PRIVATE

## 2023-12-24 PROCEDURE — 87634 RSV DNA/RNA AMP PROBE: CPT

## 2023-12-24 PROCEDURE — 71045 X-RAY EXAM CHEST 1 VIEW: CPT

## 2023-12-24 PROCEDURE — 87635 SARS-COV-2 COVID-19 AMP PRB: CPT

## 2023-12-24 PROCEDURE — 93010 ELECTROCARDIOGRAM REPORT: CPT | Performed by: INTERNAL MEDICINE

## 2023-12-24 PROCEDURE — 87502 INFLUENZA DNA AMP PROBE: CPT

## 2023-12-24 PROCEDURE — 84145 PROCALCITONIN (PCT): CPT

## 2023-12-24 PROCEDURE — 2580000003 HC RX 258: Performed by: HOSPITALIST

## 2023-12-24 PROCEDURE — 99285 EMERGENCY DEPT VISIT HI MDM: CPT

## 2023-12-24 PROCEDURE — 85025 COMPLETE CBC W/AUTO DIFF WBC: CPT

## 2023-12-24 PROCEDURE — 80053 COMPREHEN METABOLIC PANEL: CPT

## 2023-12-24 PROCEDURE — 6370000000 HC RX 637 (ALT 250 FOR IP): Performed by: HOSPITALIST

## 2023-12-24 RX ORDER — 0.9 % SODIUM CHLORIDE 0.9 %
500 INTRAVENOUS SOLUTION INTRAVENOUS ONCE
Status: COMPLETED | OUTPATIENT
Start: 2023-12-24 | End: 2023-12-24

## 2023-12-24 RX ORDER — POTASSIUM CHLORIDE 7.45 MG/ML
10 INJECTION INTRAVENOUS PRN
Status: DISCONTINUED | OUTPATIENT
Start: 2023-12-24 | End: 2023-12-26 | Stop reason: HOSPADM

## 2023-12-24 RX ORDER — DEXAMETHASONE SODIUM PHOSPHATE 10 MG/ML
6 INJECTION INTRAMUSCULAR; INTRAVENOUS EVERY 24 HOURS
Status: DISCONTINUED | OUTPATIENT
Start: 2023-12-25 | End: 2023-12-25

## 2023-12-24 RX ORDER — SODIUM CHLORIDE 0.9 % (FLUSH) 0.9 %
5-40 SYRINGE (ML) INJECTION PRN
Status: DISCONTINUED | OUTPATIENT
Start: 2023-12-24 | End: 2023-12-26 | Stop reason: HOSPADM

## 2023-12-24 RX ORDER — SENNA AND DOCUSATE SODIUM 50; 8.6 MG/1; MG/1
1 TABLET, FILM COATED ORAL NIGHTLY PRN
Status: DISCONTINUED | OUTPATIENT
Start: 2023-12-24 | End: 2023-12-26 | Stop reason: HOSPADM

## 2023-12-24 RX ORDER — MAGNESIUM SULFATE IN WATER 40 MG/ML
2000 INJECTION, SOLUTION INTRAVENOUS PRN
Status: DISCONTINUED | OUTPATIENT
Start: 2023-12-24 | End: 2023-12-26 | Stop reason: HOSPADM

## 2023-12-24 RX ORDER — ALBUTEROL SULFATE 90 UG/1
2 AEROSOL, METERED RESPIRATORY (INHALATION)
Status: DISCONTINUED | OUTPATIENT
Start: 2023-12-24 | End: 2023-12-26 | Stop reason: HOSPADM

## 2023-12-24 RX ORDER — LANOLIN ALCOHOL/MO/W.PET/CERES
3 CREAM (GRAM) TOPICAL NIGHTLY
Status: DISCONTINUED | OUTPATIENT
Start: 2023-12-24 | End: 2023-12-26 | Stop reason: HOSPADM

## 2023-12-24 RX ORDER — PROMETHAZINE HYDROCHLORIDE 12.5 MG/1
12.5 TABLET ORAL EVERY 6 HOURS PRN
Status: DISCONTINUED | OUTPATIENT
Start: 2023-12-24 | End: 2023-12-26 | Stop reason: HOSPADM

## 2023-12-24 RX ORDER — AMLODIPINE BESYLATE 5 MG/1
5 TABLET ORAL DAILY
Status: DISCONTINUED | OUTPATIENT
Start: 2023-12-25 | End: 2023-12-26 | Stop reason: HOSPADM

## 2023-12-24 RX ORDER — POLYETHYLENE GLYCOL 3350 17 G/17G
17 POWDER, FOR SOLUTION ORAL DAILY PRN
Status: DISCONTINUED | OUTPATIENT
Start: 2023-12-24 | End: 2023-12-26 | Stop reason: HOSPADM

## 2023-12-24 RX ORDER — SODIUM CHLORIDE, SODIUM LACTATE, POTASSIUM CHLORIDE, CALCIUM CHLORIDE 600; 310; 30; 20 MG/100ML; MG/100ML; MG/100ML; MG/100ML
INJECTION, SOLUTION INTRAVENOUS CONTINUOUS
Status: ACTIVE | OUTPATIENT
Start: 2023-12-24 | End: 2023-12-25

## 2023-12-24 RX ORDER — ONDANSETRON 2 MG/ML
4 INJECTION INTRAMUSCULAR; INTRAVENOUS EVERY 6 HOURS PRN
Status: DISCONTINUED | OUTPATIENT
Start: 2023-12-24 | End: 2023-12-26 | Stop reason: HOSPADM

## 2023-12-24 RX ORDER — ACETAMINOPHEN 325 MG/1
650 TABLET ORAL EVERY 6 HOURS PRN
Status: DISCONTINUED | OUTPATIENT
Start: 2023-12-24 | End: 2023-12-26 | Stop reason: HOSPADM

## 2023-12-24 RX ORDER — OSELTAMIVIR PHOSPHATE 30 MG/1
30 CAPSULE ORAL DAILY
Status: DISCONTINUED | OUTPATIENT
Start: 2023-12-25 | End: 2023-12-26 | Stop reason: HOSPADM

## 2023-12-24 RX ORDER — SODIUM CHLORIDE 9 MG/ML
INJECTION, SOLUTION INTRAVENOUS PRN
Status: DISCONTINUED | OUTPATIENT
Start: 2023-12-24 | End: 2023-12-26 | Stop reason: HOSPADM

## 2023-12-24 RX ORDER — ACETAMINOPHEN 650 MG/1
650 SUPPOSITORY RECTAL EVERY 6 HOURS PRN
Status: DISCONTINUED | OUTPATIENT
Start: 2023-12-24 | End: 2023-12-26 | Stop reason: HOSPADM

## 2023-12-24 RX ORDER — QUETIAPINE FUMARATE 25 MG/1
25 TABLET, FILM COATED ORAL 2 TIMES DAILY
Status: DISCONTINUED | OUTPATIENT
Start: 2023-12-24 | End: 2023-12-26 | Stop reason: HOSPADM

## 2023-12-24 RX ORDER — POTASSIUM CHLORIDE 20 MEQ/1
40 TABLET, EXTENDED RELEASE ORAL PRN
Status: DISCONTINUED | OUTPATIENT
Start: 2023-12-24 | End: 2023-12-26 | Stop reason: HOSPADM

## 2023-12-24 RX ORDER — DEXAMETHASONE SODIUM PHOSPHATE 10 MG/ML
10 INJECTION INTRAMUSCULAR; INTRAVENOUS ONCE
Status: COMPLETED | OUTPATIENT
Start: 2023-12-24 | End: 2023-12-24

## 2023-12-24 RX ORDER — SODIUM CHLORIDE 0.9 % (FLUSH) 0.9 %
5-40 SYRINGE (ML) INJECTION EVERY 12 HOURS SCHEDULED
Status: DISCONTINUED | OUTPATIENT
Start: 2023-12-24 | End: 2023-12-26 | Stop reason: HOSPADM

## 2023-12-24 RX ORDER — GUAIFENESIN 600 MG/1
600 TABLET, EXTENDED RELEASE ORAL 2 TIMES DAILY
Status: DISCONTINUED | OUTPATIENT
Start: 2023-12-24 | End: 2023-12-26 | Stop reason: HOSPADM

## 2023-12-24 RX ORDER — ENOXAPARIN SODIUM 100 MG/ML
30 INJECTION SUBCUTANEOUS DAILY
Status: DISCONTINUED | OUTPATIENT
Start: 2023-12-25 | End: 2023-12-26 | Stop reason: HOSPADM

## 2023-12-24 RX ORDER — ASPIRIN 81 MG/1
81 TABLET ORAL DAILY
Status: DISCONTINUED | OUTPATIENT
Start: 2023-12-25 | End: 2023-12-26 | Stop reason: HOSPADM

## 2023-12-24 RX ADMIN — SODIUM CHLORIDE, PRESERVATIVE FREE 10 ML: 5 INJECTION INTRAVENOUS at 23:16

## 2023-12-24 RX ADMIN — DEXAMETHASONE SODIUM PHOSPHATE 10 MG: 10 INJECTION INTRAMUSCULAR; INTRAVENOUS at 23:16

## 2023-12-24 RX ADMIN — QUETIAPINE FUMARATE 25 MG: 25 TABLET ORAL at 23:16

## 2023-12-24 RX ADMIN — Medication 3 MG: at 23:16

## 2023-12-24 RX ADMIN — SODIUM CHLORIDE, POTASSIUM CHLORIDE, SODIUM LACTATE AND CALCIUM CHLORIDE: 600; 310; 30; 20 INJECTION, SOLUTION INTRAVENOUS at 23:20

## 2023-12-24 RX ADMIN — SODIUM CHLORIDE 500 ML: 9 INJECTION, SOLUTION INTRAVENOUS at 21:17

## 2023-12-24 RX ADMIN — GUAIFENESIN 600 MG: 600 TABLET ORAL at 23:16

## 2023-12-24 NOTE — ED TRIAGE NOTES
Patient arrives via EMS from home c/o worsening malaise since yesterday. Dx pneumonia yesterday, given abx and compliant, but worsening symptoms.     EMS vitals: /74, HR 70 NSR, RR 18, 95% on RA, oral temp 98.6,

## 2023-12-24 NOTE — ED PROVIDER NOTES
Emergency Department Provider Note       PCP: Alberto Houser MD   Age: 80 y.o. Sex: female     DISPOSITION Decision To Discharge 12/23/2023 09:22:54 PM       ICD-10-CM    1. Atypical pneumonia  J18.9           Medical Decision Making     Complexity of Problems Addressed:  1 or more acute illnesses that pose a threat to life or bodily function. Data Reviewed and Analyzed:   I independently ordered and reviewed each unique test.     The patients assessment required an independent historian: EMS. The reason they were needed is dementia. I independently ordered and interpreted the ED EKG in the absence of a Cardiologist.    Rate: 98  EKG Interpretation: EKG Interpretation: non-specific EKG, LVH with secondary repolarization abnormality, left atrial enlargement  ST Segments: Nonspecific ST segments - NO STEMI      I interpreted the X-rays interstitial infiltrates. Discussion of management or test interpretation. Possible atypical pneumonia with interstitial infiltrates. Workup otherwise unremarkable. Chronic kidney disease slightly increased from baseline. Will prescribe Zithromax. Sats 97%. Risk of Complications and/or Morbidity of Patient Management:  Prescription drug management performed. Shared medical decision making was utilized in creating the patients health plan today. Is this patient to be included in the SEP-1 core measure due to severe sepsis or septic shock? No Exclusion criteria - the patient is NOT to be included for SEP-1 Core Measure due to: May have criteria for sepsis, but does not meet criteria for severe sepsis or septic shock      History      66-year-old female with history of dementia presents by EMS from home with sudden onset shortness of breath about 20 minutes prior to arrival after eating dinner. EMS noted she sounded \"gurgly. \"  She arrives tachycardic. No complaints.            Review of Systems   Unable to perform ROS: Dementia   Respiratory:  Positive

## 2023-12-24 NOTE — ED NOTES
Second set of blood cultures delayed d/t pt being a hard stick. Provider notified. Provider stated that second set of cultures are not needed at this time. Pt good to be discharged per provider.       Darvin Barger, RN  12/23/23 3384

## 2023-12-25 LAB
ANION GAP SERPL CALC-SCNC: 4 MMOL/L (ref 2–11)
BASOPHILS # BLD: 0 K/UL (ref 0–0.2)
BASOPHILS NFR BLD: 0 % (ref 0–2)
BUN SERPL-MCNC: 24 MG/DL (ref 8–23)
CALCIUM SERPL-MCNC: 8.5 MG/DL (ref 8.3–10.4)
CHLORIDE SERPL-SCNC: 111 MMOL/L (ref 103–113)
CO2 SERPL-SCNC: 23 MMOL/L (ref 21–32)
CREAT SERPL-MCNC: 0.8 MG/DL (ref 0.6–1)
DIFFERENTIAL METHOD BLD: ABNORMAL
EOSINOPHIL # BLD: 0 K/UL (ref 0–0.8)
EOSINOPHIL NFR BLD: 0 % (ref 0.5–7.8)
ERYTHROCYTE [DISTWIDTH] IN BLOOD BY AUTOMATED COUNT: 13.8 % (ref 11.9–14.6)
GLUCOSE SERPL-MCNC: 128 MG/DL (ref 65–100)
HCT VFR BLD AUTO: 36.8 % (ref 35.8–46.3)
HGB BLD-MCNC: 11.1 G/DL (ref 11.7–15.4)
IMM GRANULOCYTES # BLD AUTO: 0 K/UL (ref 0–0.5)
IMM GRANULOCYTES NFR BLD AUTO: 0 % (ref 0–5)
LYMPHOCYTES # BLD: 0.4 K/UL (ref 0.5–4.6)
LYMPHOCYTES NFR BLD: 10 % (ref 13–44)
MCH RBC QN AUTO: 32.1 PG (ref 26.1–32.9)
MCHC RBC AUTO-ENTMCNC: 30.2 G/DL (ref 31.4–35)
MCV RBC AUTO: 106.4 FL (ref 82–102)
MONOCYTES # BLD: 0.2 K/UL (ref 0.1–1.3)
MONOCYTES NFR BLD: 4 % (ref 4–12)
NEUTS SEG # BLD: 3.6 K/UL (ref 1.7–8.2)
NEUTS SEG NFR BLD: 85 % (ref 43–78)
NRBC # BLD: 0 K/UL (ref 0–0.2)
PLATELET # BLD AUTO: 145 K/UL (ref 150–450)
PMV BLD AUTO: 10.8 FL (ref 9.4–12.3)
POTASSIUM SERPL-SCNC: 4.8 MMOL/L (ref 3.5–5.1)
RBC # BLD AUTO: 3.46 M/UL (ref 4.05–5.2)
SODIUM SERPL-SCNC: 138 MMOL/L (ref 136–146)
WBC # BLD AUTO: 4.2 K/UL (ref 4.3–11.1)

## 2023-12-25 PROCEDURE — 6370000000 HC RX 637 (ALT 250 FOR IP): Performed by: HOSPITALIST

## 2023-12-25 PROCEDURE — 2580000003 HC RX 258: Performed by: HOSPITALIST

## 2023-12-25 PROCEDURE — 6360000002 HC RX W HCPCS: Performed by: HOSPITALIST

## 2023-12-25 PROCEDURE — 94761 N-INVAS EAR/PLS OXIMETRY MLT: CPT

## 2023-12-25 PROCEDURE — 94640 AIRWAY INHALATION TREATMENT: CPT

## 2023-12-25 PROCEDURE — 36415 COLL VENOUS BLD VENIPUNCTURE: CPT

## 2023-12-25 PROCEDURE — 80048 BASIC METABOLIC PNL TOTAL CA: CPT

## 2023-12-25 PROCEDURE — 1100000000 HC RM PRIVATE

## 2023-12-25 PROCEDURE — 94760 N-INVAS EAR/PLS OXIMETRY 1: CPT

## 2023-12-25 PROCEDURE — 85025 COMPLETE CBC W/AUTO DIFF WBC: CPT

## 2023-12-25 RX ADMIN — AMLODIPINE BESYLATE 5 MG: 5 TABLET ORAL at 09:11

## 2023-12-25 RX ADMIN — GUAIFENESIN 600 MG: 600 TABLET ORAL at 09:11

## 2023-12-25 RX ADMIN — SODIUM CHLORIDE, PRESERVATIVE FREE 5 ML: 5 INJECTION INTRAVENOUS at 09:13

## 2023-12-25 RX ADMIN — ASPIRIN 81 MG: 81 TABLET ORAL at 09:12

## 2023-12-25 RX ADMIN — SODIUM CHLORIDE, PRESERVATIVE FREE 10 ML: 5 INJECTION INTRAVENOUS at 20:56

## 2023-12-25 RX ADMIN — QUETIAPINE FUMARATE 25 MG: 25 TABLET ORAL at 20:56

## 2023-12-25 RX ADMIN — Medication 3 MG: at 20:56

## 2023-12-25 RX ADMIN — ENOXAPARIN SODIUM 30 MG: 100 INJECTION SUBCUTANEOUS at 09:11

## 2023-12-25 RX ADMIN — GUAIFENESIN 600 MG: 600 TABLET ORAL at 20:56

## 2023-12-25 RX ADMIN — ALBUTEROL SULFATE 2 PUFF: 90 AEROSOL, METERED RESPIRATORY (INHALATION) at 16:20

## 2023-12-25 RX ADMIN — ALBUTEROL SULFATE 2 PUFF: 90 AEROSOL, METERED RESPIRATORY (INHALATION) at 10:56

## 2023-12-25 RX ADMIN — ALBUTEROL SULFATE 2 PUFF: 90 AEROSOL, METERED RESPIRATORY (INHALATION) at 20:19

## 2023-12-25 RX ADMIN — QUETIAPINE FUMARATE 25 MG: 25 TABLET ORAL at 09:12

## 2023-12-25 RX ADMIN — OSELTAMIVIR PHOSPHATE 30 MG: 30 CAPSULE ORAL at 09:11

## 2023-12-25 NOTE — ED PROVIDER NOTES
IV        Medications given during this emergency department visit:  Medications - No data to display    New Prescriptions    No medications on file        Past Medical History:   Diagnosis Date    Absolute anemia 3/1/2017    Generalized osteoarthrosis, unspecified site 3/1/2017    Hypertensive heart disease without HF (heart failure) 3/1/2017        No past surgical history on file.      Social History     Socioeconomic History    Marital status:    Tobacco Use    Smoking status: Former    Smokeless tobacco: Never   Substance and Sexual Activity    Alcohol use: Yes    Drug use: No        Previous Medications    AMLODIPINE (NORVASC) 5 MG TABLET    Take 1 tablet by mouth daily    ASPIRIN 81 MG EC TABLET    Take 1 tablet by mouth daily    AZITHROMYCIN (ZITHROMAX) 250 MG TABLET    Take 1 tablet by mouth See Admin Instructions for 5 days 500mg on day 1 followed by 250mg on days 2 - 5    BENAZEPRIL (LOTENSIN) 10 MG TABLET    Take 1 tablet by mouth daily    DIAPERS & SUPPLIES MISC    1 each by Does not apply route daily as needed (Urinary bowel incontinence)    MIRTAZAPINE (REMERON SOL-TAB) 15 MG DISINTEGRATING TABLET    Take 1 tablet by mouth nightly    NUTRITIONAL SUPPLEMENTS (ENSURE ACTIVE) LIQD    1 twice a day as tolerated    POLYETHYLENE GLYCOL (GLYCOLAX) 17 GM/SCOOP POWDER    Take 17 g by mouth daily    QUETIAPINE (SEROQUEL) 50 MG TABLET    Take 1 tablet by mouth 2 times daily 1-2 bedtime and can take an additional during the day as needed,    SENNA-DOCUSATE (PERICOLACE) 8.6-50 MG PER TABLET    1 twice a day as needed for constipation        Results for orders placed or performed during the hospital encounter of 12/24/23   COVID-19, Rapid    Specimen: Nasopharyngeal   Result Value Ref Range    Source NASAL      SARS-CoV-2, Rapid Detected (AA) NOTD     Influenza A/B, Molecular    Specimen: Nasopharyngeal   Result Value Ref Range    Influenza A, ELICEO Detected (A) NOTD      Influenza B, ELICEO Not detected NOTD

## 2023-12-25 NOTE — PLAN OF CARE
Problem: Safety - Adult  Goal: Free from fall injury  Outcome: Progressing     Problem: Confusion  Goal: Confusion, delirium, dementia, or psychosis is improved or at baseline  Description: INTERVENTIONS:  1. Assess for possible contributors to thought disturbance, including medications, impaired vision or hearing, underlying metabolic abnormalities, dehydration, psychiatric diagnoses, and notify attending LIP  2. [place] high risk fall precautions, as indicated  3. Provide frequent short contacts to provide reality reorientation, refocusing and direction  4. Decrease environmental stimuli, including noise as appropriate  5. Monitor and intervene to maintain adequate nutrition, hydration, elimination, sleep and activity  6. If unable to ensure safety without constant attention obtain sitter and review sitter guidelines with assigned personnel  7.  Initiate Psychosocial CNS and Spiritual Care consult, as indicated  Outcome: Progressing

## 2023-12-25 NOTE — H&P
Hospitalist History and Physical   Admit Date:  2023  6:41 PM   Name:  Deirdre Verde   Age:  80 y.o. Sex:  female  :  1/10/1934   MRN:  [id number]   Room:  ER30/30    Presenting/Chief Complaint: Fatigue     Reason(s) for Admission: Influenza A with respiratory manifestations [J10.1]     History of Present Illness: Deirdre Verde is a 80 y.o. female with medical history of dementia, protein calorie malnutrition, HTN, questionable A-fib not on any anticoagulation presented from home in view of worsening generalized weakness and malaise over the past 24 to 48 hours. Patient was seen in the emergency department yesterday for similar complaints, was tested negative for COVID and flu and was sent home. Family stated that today patient looked much worse, was unable to clear her usual self, looked much more lethargic and weak with some cough. Patient is not able to provide meaningful information, history obtained mainly from patient's son. As per the son, no evidence of fever, nausea or vomiting was noted. Family is concerned about clinical worsening and unable to take care of the patient at home. Today, patient tested positive for both influenza A and COVID-19. Chest x-ray was consistent with bilateral interstitial infiltrates suggestive of developing pneumonia. Vital signs on arrival: Tmax 98.6, respiratory rate 20, heart rate 82, /72, saturating at 94% on room air. Blood work was remarkable for BUN 28, creatinine 1.1, albumin 2.8, BMI 19.2. Assessment & Plan:     Principal Problem:    Influenza A with respiratory manifestations    Infection due to COVID-19 virus variant of concern  Plan: -  Clinical presentation and symptoms consistent with influenza and COVID-19 infection. Chest x-ray consistent with developing pneumonia. Check lactic acid, Pro-Twin and CRP. Starting on Decadron given 10 mg x 1 today. Continue 6 mg daily for 9 days from tomorrow.   Supplemental oxygen as

## 2023-12-25 NOTE — ED NOTES
TRANSFER - OUT REPORT:    Verbal report given to [address] on [facility]  being transferred to room 818 for routine progression of patient care       Report consisted of patient's Situation, Background, Assessment and   Recommendations(SBAR). Information from the following report(s) ED SBAR was reviewed with the receiving nurse. Lines:   Peripheral IV 12/24/23 Left;Posterior Hand (Active)   Site Assessment Clean, dry & intact 12/24/23 2110   Line Status Blood return noted;Normal saline locked; Flushed 12/24/23 2110   Line Care Connections checked and tightened 12/24/23 2110   Phlebitis Assessment No symptoms 12/24/23 2110   Infiltration Assessment 0 12/24/23 2110   Alcohol Cap Used No 12/24/23 2110   Dressing Status Clean, dry & intact 12/24/23 2110   Dressing Type Transparent 12/24/23 2110        Opportunity for questions and clarification was provided.       Patient transported with:  Chantelle Webb, [address]  12/24/23 2114

## 2023-12-26 VITALS
TEMPERATURE: 99.3 F | HEIGHT: 62 IN | BODY MASS INDEX: 18.82 KG/M2 | RESPIRATION RATE: 18 BRPM | HEART RATE: 80 BPM | WEIGHT: 102.3 LBS | OXYGEN SATURATION: 95 % | SYSTOLIC BLOOD PRESSURE: 113 MMHG | DIASTOLIC BLOOD PRESSURE: 87 MMHG

## 2023-12-26 LAB
ANION GAP SERPL CALC-SCNC: 2 MMOL/L (ref 2–11)
BASOPHILS # BLD: 0 K/UL (ref 0–0.2)
BASOPHILS NFR BLD: 0 % (ref 0–2)
BUN SERPL-MCNC: 31 MG/DL (ref 8–23)
CALCIUM SERPL-MCNC: 8.2 MG/DL (ref 8.3–10.4)
CHLORIDE SERPL-SCNC: 114 MMOL/L (ref 103–113)
CO2 SERPL-SCNC: 24 MMOL/L (ref 21–32)
CREAT SERPL-MCNC: 1 MG/DL (ref 0.6–1)
DIFFERENTIAL METHOD BLD: ABNORMAL
EOSINOPHIL # BLD: 0 K/UL (ref 0–0.8)
EOSINOPHIL NFR BLD: 0 % (ref 0.5–7.8)
ERYTHROCYTE [DISTWIDTH] IN BLOOD BY AUTOMATED COUNT: 14.1 % (ref 11.9–14.6)
GLUCOSE SERPL-MCNC: 87 MG/DL (ref 65–100)
HCT VFR BLD AUTO: 32.6 % (ref 35.8–46.3)
HGB BLD-MCNC: 10 G/DL (ref 11.7–15.4)
IMM GRANULOCYTES # BLD AUTO: 0 K/UL (ref 0–0.5)
IMM GRANULOCYTES NFR BLD AUTO: 0 % (ref 0–5)
LYMPHOCYTES # BLD: 1 K/UL (ref 0.5–4.6)
LYMPHOCYTES NFR BLD: 20 % (ref 13–44)
MCH RBC QN AUTO: 32.4 PG (ref 26.1–32.9)
MCHC RBC AUTO-ENTMCNC: 30.7 G/DL (ref 31.4–35)
MCV RBC AUTO: 105.5 FL (ref 82–102)
MONOCYTES # BLD: 0.4 K/UL (ref 0.1–1.3)
MONOCYTES NFR BLD: 8 % (ref 4–12)
NEUTS SEG # BLD: 3.6 K/UL (ref 1.7–8.2)
NEUTS SEG NFR BLD: 72 % (ref 43–78)
NRBC # BLD: 0 K/UL (ref 0–0.2)
PLATELET # BLD AUTO: 169 K/UL (ref 150–450)
PMV BLD AUTO: 11.2 FL (ref 9.4–12.3)
POTASSIUM SERPL-SCNC: 4.8 MMOL/L (ref 3.5–5.1)
RBC # BLD AUTO: 3.09 M/UL (ref 4.05–5.2)
SODIUM SERPL-SCNC: 140 MMOL/L (ref 136–146)
WBC # BLD AUTO: 5 K/UL (ref 4.3–11.1)

## 2023-12-26 PROCEDURE — 6360000002 HC RX W HCPCS: Performed by: HOSPITALIST

## 2023-12-26 PROCEDURE — 94761 N-INVAS EAR/PLS OXIMETRY MLT: CPT

## 2023-12-26 PROCEDURE — 94640 AIRWAY INHALATION TREATMENT: CPT

## 2023-12-26 PROCEDURE — 6370000000 HC RX 637 (ALT 250 FOR IP): Performed by: HOSPITALIST

## 2023-12-26 PROCEDURE — 80048 BASIC METABOLIC PNL TOTAL CA: CPT

## 2023-12-26 PROCEDURE — 97530 THERAPEUTIC ACTIVITIES: CPT

## 2023-12-26 PROCEDURE — 97166 OT EVAL MOD COMPLEX 45 MIN: CPT

## 2023-12-26 PROCEDURE — 85025 COMPLETE CBC W/AUTO DIFF WBC: CPT

## 2023-12-26 PROCEDURE — 2580000003 HC RX 258: Performed by: HOSPITALIST

## 2023-12-26 PROCEDURE — 36415 COLL VENOUS BLD VENIPUNCTURE: CPT

## 2023-12-26 PROCEDURE — 6370000000 HC RX 637 (ALT 250 FOR IP): Performed by: INTERNAL MEDICINE

## 2023-12-26 RX ORDER — GUAIFENESIN 600 MG/1
600 TABLET, EXTENDED RELEASE ORAL 2 TIMES DAILY
Qty: 14 TABLET | Refills: 0 | Status: SHIPPED | OUTPATIENT
Start: 2023-12-26 | End: 2024-01-02

## 2023-12-26 RX ORDER — QUETIAPINE FUMARATE 25 MG/1
25 TABLET, FILM COATED ORAL 2 TIMES DAILY
Qty: 60 TABLET | Refills: 3 | Status: SHIPPED | OUTPATIENT
Start: 2023-12-26

## 2023-12-26 RX ORDER — OSELTAMIVIR PHOSPHATE 30 MG/1
30 CAPSULE ORAL DAILY
Qty: 3 CAPSULE | Refills: 0 | Status: SHIPPED | OUTPATIENT
Start: 2023-12-27 | End: 2023-12-30

## 2023-12-26 RX ORDER — ALBUTEROL SULFATE 90 UG/1
2 AEROSOL, METERED RESPIRATORY (INHALATION)
Qty: 18 G | Refills: 3 | Status: SHIPPED | OUTPATIENT
Start: 2023-12-26

## 2023-12-26 RX ORDER — ENEMA 19; 7 G/133ML; G/133ML
1 ENEMA RECTAL
Status: COMPLETED | OUTPATIENT
Start: 2023-12-26 | End: 2023-12-26

## 2023-12-26 RX ADMIN — QUETIAPINE FUMARATE 25 MG: 25 TABLET ORAL at 08:29

## 2023-12-26 RX ADMIN — MOMETASONE FUROATE AND FORMOTEROL FUMARATE DIHYDRATE 2 PUFF: 200; 5 AEROSOL RESPIRATORY (INHALATION) at 07:58

## 2023-12-26 RX ADMIN — POLYETHYLENE GLYCOL 3350 17 G: 17 POWDER, FOR SOLUTION ORAL at 09:30

## 2023-12-26 RX ADMIN — ASPIRIN 81 MG: 81 TABLET ORAL at 08:29

## 2023-12-26 RX ADMIN — ENOXAPARIN SODIUM 30 MG: 100 INJECTION SUBCUTANEOUS at 08:29

## 2023-12-26 RX ADMIN — GUAIFENESIN 600 MG: 600 TABLET ORAL at 08:29

## 2023-12-26 RX ADMIN — AMLODIPINE BESYLATE 5 MG: 5 TABLET ORAL at 08:29

## 2023-12-26 RX ADMIN — SODIUM CHLORIDE, PRESERVATIVE FREE 10 ML: 5 INJECTION INTRAVENOUS at 08:36

## 2023-12-26 RX ADMIN — SODIUM PHOSPHATE 1 ENEMA: 7; 19 ENEMA RECTAL at 15:58

## 2023-12-26 RX ADMIN — ALBUTEROL SULFATE 2 PUFF: 90 AEROSOL, METERED RESPIRATORY (INHALATION) at 11:25

## 2023-12-26 RX ADMIN — ACETAMINOPHEN 650 MG: 325 TABLET ORAL at 08:29

## 2023-12-26 RX ADMIN — OSELTAMIVIR PHOSPHATE 30 MG: 30 CAPSULE ORAL at 09:30

## 2023-12-26 RX ADMIN — ALBUTEROL SULFATE 2 PUFF: 90 AEROSOL, METERED RESPIRATORY (INHALATION) at 07:58

## 2023-12-26 NOTE — CONSULTS
Comprehensive Nutrition Assessment    Type and Reason for Visit: Initial  Poor Intake/Appetite 5 or More Days (Hospitalists)    Nutrition Recommendations/Plan:   Meals and Snacks:  Diet: Continue current order  Nutrition Supplement Therapy:  Medical food supplement therapy:  Initiate Glucerna Shake twice per day (this provides 220 kcal and 10 grams protein per bottle) - strawberry     Malnutrition Assessment:  Malnutrition Status: Moderate malnutrition  Context: Chronic Illness  Findings of clinical characteristics of malnutrition:   Energy Intake:  Unable to assess  Weight Loss:  Mild weight loss (specify amount and time period) (2-3 lb weight loss from UBW)     Body Fat Loss:  Mild body fat loss Orbital, Buccal region   Muscle Mass Loss:  Mild muscle mass loss (Moderate) Temples (temporalis), Clavicles (pectoralis & deltoids), Hand (interosseous)  Fluid Accumulation:  Unable to assess     Strength:  Not Performed     Nutrition Assessment:  Nutrition History: Family provides nutrition hx 2/2 pt dementia. Reports patient has 2 meals per day at baseline with several snacks throughout the day. Reports the patient does not necessarily love ensure, but they have been working to consume 1 per day. States she has always been lean. Has not noticed any acute changes in weight/appetite pta. Do You Have Any Cultural, Adventist, or Ethnic Food Preferences?: No   Weight History: Limited wt hx available per EMR review. Majority of weights recorded are reported by pt or family and are not measured weights. Reported weights of 115 lb in 2020; 110 lb in 2021, and is now down to ~100-105#. Pt consistently losing weight over the past several years. Nutrition Background:       PMH: dementia, protein calorie malnutrition, HTN, Afib. Presented with the Flu and COVID 19. Nutrition Interval:  Visited with patient and several family members in room. Observed pt consuming lunch, had consumed approximately 50-75% of her lunch.

## 2023-12-26 NOTE — ACP (ADVANCE CARE PLANNING)
Advance Care Planning     General Advance Care Planning (ACP) Conversation    Date of Conversation: 12/24/2023  Conducted with: Patient with Decision Making Capacity    Healthcare Decision Maker:    Primary Decision Maker: Kimberly Martinez - [phone]  Click here to complete Healthcare Decision Makers including selection of the Healthcare Decision Maker Relationship (ie \"Primary\"). Today we documented Decision Maker(s) consistent with Legal Next of Kin hierarchy. Content/Action Overview:   Has ACP document(s) on file - reflects the patient's care preferences  Reviewed DNR/DNI and patient elects Full Code (Attempt Resuscitation)        Length of Voluntary ACP Conversation in minutes:  <16 minutes (Non-Billable)    To Da Silva RN

## 2023-12-26 NOTE — DISCHARGE INSTRUCTIONS
DISCHARGE SUMMARY from Nurse    PATIENT INSTRUCTIONS:    What to do at Home:  Recommended activity: activity as tolerated    If you experience a return of any symptoms, please follow up with your Primary Care Provider. *  Please give a list of your current medications to your Primary Care Provider. *  Please update this list whenever your medications are discontinued, doses are      changed, or new medications (including over-the-counter products) are added. *  Please carry medication information at all times in case of emergency situations. These are general instructions for a healthy lifestyle:    No smoking/ No tobacco products/ Avoid exposure to second hand smoke  Surgeon General's Warning:  Quitting smoking now greatly reduces serious risk to your health. Obesity, smoking, and sedentary lifestyle greatly increases your risk for illness    A healthy diet, regular physical exercise & weight monitoring are important for maintaining a healthy lifestyle        The discharge information has been reviewed with the {PATIENT PARENT GUARDIAN:[id number]}.

## 2023-12-26 NOTE — PLAN OF CARE
Problem: Respiratory - Adult  Goal: Achieves optimal ventilation and oxygenation  Outcome: Progressing  Flowsheets (Taken 12/26/2023 0803)  Achieves optimal ventilation and oxygenation:   Assess for changes in respiratory status   Respiratory therapy support as indicated   Position to facilitate oxygenation and minimize respiratory effort   Assess and instruct to report shortness of breath or any respiratory difficulty   Assess for changes in mentation and behavior

## 2023-12-26 NOTE — CARE COORDINATION
MSN, CM:  spoke with patient's son and daughter about discharge planning. Patient lives with her son in own home with 3 steps for entrance. Patient is dependent on family for all her care. Patient uses a cane or walker for ambulation. Patient is not current with HH, rehab or home oxygen. Patient is retired and her children drive her to all appointments. PT/OT consulted and recommended HH. Family agrees with [facility] and Palliative Care. Patient's daughter is her [facility] aide for 6 hrs/day. Patient's family agree with this discharge plan and patient has met all milestones for this admission. Family request EMS transport which will be at 18:30.         12/26/23 1505   Service Assessment   Patient Orientation Alert and Oriented;Self   Cognition Alert   History Provided By Child/Family   Primary [address] [place] [address] Children;Family Members   Patient's Healthcare Decision Maker is: Legal Next of [address] [place] [address]   PCP Verified by CM Yes   Last Visit to PCP Within last 3 months  ([address])   Prior Functional Level Assistance with the following:;Bathing;Dressing;Cooking;Housework; Shopping;Mobility   Current Functional Level Other (see comment)  (PT/OT consulted)   Can patient return to prior living arrangement Yes   Ability to make needs known: Good   Family able to assist with home care needs: Yes   Would you like for me to discuss the discharge plan with any other family members/significant others, and if so, who?  Yes  (Son)   Financial Resources Medicare   Community Resources ECF/Home Care  ([facility])   Social/Functional History   Lives With Son   Type of [address] [place] [address] Dr One level   [address] to enter with rails   Entrance Stairs - Number of Steps 3   Entrance Stairs - Rails Both   Bathroom Shower/Tub Tub/Shower unit   Bathroom Toilet Standard   Bathroom Equipment Shower chair   [address] [facility] Help From Family   ADL Assistance Needs

## 2023-12-26 NOTE — DISCHARGE SUMMARY
GLUCOSEU Negative 09/23/2021 10:52 PM    KETUA Negative 09/23/2021 10:52 PM    BILIRUBINUR Negative 09/23/2021 10:52 PM    BLOODU Negative 09/23/2021 10:52 PM    NITRU Negative 09/23/2021 10:52 PM    LEUKOCYTESUR Negative 09/23/2021 10:52 PM        Microbiology:  Results       Procedure Component Value Units Date/Time    Influenza A/B, Molecular [[id number]]  (Abnormal) Collected: 12/24/23 1915    Order Status: Completed Specimen: Nasopharyngeal Updated: 12/24/23 2001     Influenza A, ELICEO Detected        Comment: Negative results do not preclude infection with influenza virus and should not be the sole basis of a patient treatment decision. Influenza B, ELICEO Not detected       Respiratory Syncytial Virus, Molecular [[id number]] Collected: 12/24/23 1915    Order Status: Completed Specimen: Blood Serum Updated: 12/24/23 2001     RSV by NAAT Not detected        Comment: NEGATIVE FOR THE PRESENCE OF RSV NUCLEIC ACID  Negative results do not preclude infection with RSV and should not be used as the sole basis for patient management decisions. Methodology: Isothermal Nucleic Acid Amplification  Rapid Abbott ID Now  **Intended for use in patients <25years of age and >61ears of ageNayana Lagos [[id number]]  (Abnormal) Collected: 12/24/23 1911    Order Status: Completed Specimen: Nasopharyngeal Updated: 12/24/23 1946     Source NASAL        SARS-CoV-2, Rapid Detected        Comment:    The specimen is POSITIVE for SARS-CoV-2, the novel coronavirus associated with COVID-19. This test has been authorized by the FDA under an Emergency Use Authorization (EUA) for use by authorized laboratories.       Fact sheet for Healthcare Providers: ConventionUpdate.co.nz  Fact sheet for Patients: ConventionUpdate.co.nz     Methodology: Isothermal Nucleic Acid Amplification         Blood Culture 1 [[id number]] Collected: 12/23/23 1957    Order Status: Completed Specimen: Blood

## 2023-12-28 LAB
BACTERIA SPEC CULT: NORMAL
SERVICE CMNT-IMP: NORMAL